# Patient Record
Sex: FEMALE | Race: WHITE | Employment: FULL TIME | URBAN - METROPOLITAN AREA
[De-identification: names, ages, dates, MRNs, and addresses within clinical notes are randomized per-mention and may not be internally consistent; named-entity substitution may affect disease eponyms.]

---

## 2017-04-28 ENCOUNTER — OFFICE VISIT (OUTPATIENT)
Dept: FAMILY MEDICINE CLINIC | Age: 27
End: 2017-04-28

## 2017-04-28 VITALS
OXYGEN SATURATION: 98 % | SYSTOLIC BLOOD PRESSURE: 122 MMHG | DIASTOLIC BLOOD PRESSURE: 78 MMHG | HEIGHT: 64 IN | BODY MASS INDEX: 33.29 KG/M2 | HEART RATE: 86 BPM | WEIGHT: 195 LBS

## 2017-04-28 DIAGNOSIS — E66.9 OBESITY (BMI 30.0-34.9): ICD-10-CM

## 2017-04-28 DIAGNOSIS — I73.00 RAYNAUD'S PHENOMENON WITHOUT GANGRENE: ICD-10-CM

## 2017-04-28 DIAGNOSIS — Z00.00 ANNUAL PHYSICAL EXAM: ICD-10-CM

## 2017-04-28 LAB
A/G RATIO: 1.7 (ref 1.1–2.2)
ALBUMIN SERPL-MCNC: 4.4 G/DL (ref 3.4–5)
ALP BLD-CCNC: 60 U/L (ref 40–129)
ALT SERPL-CCNC: 13 U/L (ref 10–40)
ANION GAP SERPL CALCULATED.3IONS-SCNC: 13 MMOL/L (ref 3–16)
AST SERPL-CCNC: 18 U/L (ref 15–37)
BASOPHILS ABSOLUTE: 0.1 K/UL (ref 0–0.2)
BASOPHILS RELATIVE PERCENT: 1 %
BILIRUB SERPL-MCNC: 0.6 MG/DL (ref 0–1)
BUN BLDV-MCNC: 11 MG/DL (ref 7–20)
CALCIUM SERPL-MCNC: 9.5 MG/DL (ref 8.3–10.6)
CHLORIDE BLD-SCNC: 102 MMOL/L (ref 99–110)
CHOLESTEROL, TOTAL: 123 MG/DL (ref 0–199)
CO2: 26 MMOL/L (ref 21–32)
CREAT SERPL-MCNC: 0.6 MG/DL (ref 0.6–1.1)
EOSINOPHILS ABSOLUTE: 0.3 K/UL (ref 0–0.6)
EOSINOPHILS RELATIVE PERCENT: 4.1 %
GFR AFRICAN AMERICAN: >60
GFR NON-AFRICAN AMERICAN: >60
GLOBULIN: 2.6 G/DL
GLUCOSE BLD-MCNC: 87 MG/DL (ref 70–99)
HCT VFR BLD CALC: 43.4 % (ref 36–48)
HDLC SERPL-MCNC: 48 MG/DL (ref 40–60)
HEMOGLOBIN: 14.2 G/DL (ref 12–16)
LDL CHOLESTEROL CALCULATED: 61 MG/DL
LYMPHOCYTES ABSOLUTE: 2.6 K/UL (ref 1–5.1)
LYMPHOCYTES RELATIVE PERCENT: 37 %
MCH RBC QN AUTO: 29.3 PG (ref 26–34)
MCHC RBC AUTO-ENTMCNC: 32.8 G/DL (ref 31–36)
MCV RBC AUTO: 89.5 FL (ref 80–100)
MONOCYTES ABSOLUTE: 0.7 K/UL (ref 0–1.3)
MONOCYTES RELATIVE PERCENT: 9.3 %
NEUTROPHILS ABSOLUTE: 3.4 K/UL (ref 1.7–7.7)
NEUTROPHILS RELATIVE PERCENT: 48.6 %
PDW BLD-RTO: 13.9 % (ref 12.4–15.4)
PLATELET # BLD: 300 K/UL (ref 135–450)
PMV BLD AUTO: 8.9 FL (ref 5–10.5)
POTASSIUM SERPL-SCNC: 4.8 MMOL/L (ref 3.5–5.1)
RBC # BLD: 4.85 M/UL (ref 4–5.2)
SODIUM BLD-SCNC: 141 MMOL/L (ref 136–145)
TOTAL PROTEIN: 7 G/DL (ref 6.4–8.2)
TRIGL SERPL-MCNC: 72 MG/DL (ref 0–150)
TSH REFLEX: 3.09 UIU/ML (ref 0.27–4.2)
VLDLC SERPL CALC-MCNC: 14 MG/DL
WBC # BLD: 7 K/UL (ref 4–11)

## 2017-04-28 PROCEDURE — 36415 COLL VENOUS BLD VENIPUNCTURE: CPT | Performed by: NURSE PRACTITIONER

## 2017-04-28 PROCEDURE — 99385 PREV VISIT NEW AGE 18-39: CPT | Performed by: NURSE PRACTITIONER

## 2017-05-01 LAB — HIV-1 AND HIV-2 ANTIBODIES: NEGATIVE

## 2017-06-01 ENCOUNTER — OFFICE VISIT (OUTPATIENT)
Dept: RHEUMATOLOGY | Age: 27
End: 2017-06-01

## 2017-06-01 VITALS
SYSTOLIC BLOOD PRESSURE: 110 MMHG | TEMPERATURE: 98.3 F | WEIGHT: 197 LBS | HEIGHT: 64 IN | DIASTOLIC BLOOD PRESSURE: 80 MMHG | BODY MASS INDEX: 33.63 KG/M2

## 2017-06-01 DIAGNOSIS — I73.00 RAYNAUD'S PHENOMENON WITHOUT GANGRENE: Primary | ICD-10-CM

## 2017-06-01 DIAGNOSIS — I73.00 RAYNAUD'S PHENOMENON WITHOUT GANGRENE: ICD-10-CM

## 2017-06-01 DIAGNOSIS — M34.9 LIMITED SCLERODERMA (HCC): ICD-10-CM

## 2017-06-01 LAB
BACTERIA: ABNORMAL /HPF
BILIRUBIN URINE: NEGATIVE
BLOOD, URINE: ABNORMAL
C-REACTIVE PROTEIN: 3.2 MG/L (ref 0–5.1)
CLARITY: CLEAR
COLOR: YELLOW
COMMENT UA: ABNORMAL
EPITHELIAL CELLS, UA: 4 /HPF (ref 0–5)
GLUCOSE URINE: NEGATIVE MG/DL
HYALINE CASTS: 3 /LPF (ref 0–8)
KETONES, URINE: NEGATIVE MG/DL
LEUKOCYTE ESTERASE, URINE: ABNORMAL
MICROSCOPIC EXAMINATION: YES
NITRITE, URINE: NEGATIVE
PH UA: 7
PROTEIN UA: NEGATIVE MG/DL
RBC UA: 4 /HPF (ref 0–4)
RHEUMATOID FACTOR: <10 IU/ML
SEDIMENTATION RATE, ERYTHROCYTE: 12 MM/HR (ref 0–20)
SPECIFIC GRAVITY UA: 1.02
URINE TYPE: ABNORMAL
UROBILINOGEN, URINE: 0.2 E.U./DL
WBC UA: 1 /HPF (ref 0–5)

## 2017-06-01 PROCEDURE — 99245 OFF/OP CONSLTJ NEW/EST HI 55: CPT | Performed by: INTERNAL MEDICINE

## 2017-06-02 LAB
ANA INTERPRETATION: ABNORMAL
ANA PATTERN: ABNORMAL
ANA TITER: ABNORMAL
ANTI-NUCLEAR ANTIBODY (ANA): POSITIVE
ENA TO RNP ANTIBODY: NEGATIVE EU
ENA TO SMITH (SM) ANTIBODY: NEGATIVE EU
ENA TO SSA (RO) ANTIBODY: NEGATIVE EU
ENA TO SSB (LA) ANTIBODY: NEGATIVE EU
PATHOLOGIST: ABNORMAL

## 2017-06-03 LAB
ANTICARDIOLIPIN IGG ANTIBODY: 4 GPL (ref 0–14)
BETA-2 GLYCOPROTEIN 1 IGG ANTIBODY: 1 SGU (ref 0–20)
BETA-2 GLYCOPROTEIN 1 IGM ANTIBODY: 8 SMU (ref 0–20)
CARDIOLIPIN AB IGM: 16 MPL (ref 0–12)
CCP IGG ANTIBODIES: 5 UNITS (ref 0–19)
DOUBLE STRANDED DNA AB, IGG: NORMAL
SCLERODERMA (SCL-70) AB: 112 AU/ML (ref 0–40)

## 2017-06-04 DIAGNOSIS — M34.9 SCLERODERMA (HCC): Primary | ICD-10-CM

## 2017-06-04 LAB
DRVVT CONFIRMATION TEST: NORMAL RATIO
DRVVT SCREEN: 33 SEC (ref 33–44)
DRVVT,DIL: NORMAL SEC (ref 33–44)
HEXAGONAL PHOSPHOLIPID NEUTRALIZAT TEST: NORMAL
LUPUS ANTICOAG INTERP: NORMAL
PLT NEUTA: NORMAL
PT D: 13.2 SEC (ref 12–15.5)
PTT D: 40 SEC (ref 32–48)
PTT-D CORR REFLEX: NORMAL SEC (ref 32–48)
PTT-HEPARIN NEUTRALIZED: NORMAL SEC (ref 32–48)
REPTILASE TIME: NORMAL SEC
THROMBIN TIME: NORMAL SEC (ref 14.7–19.5)

## 2017-06-06 LAB — CRYOGLOBULIN, QUALITATIVE: NORMAL

## 2017-06-20 ENCOUNTER — HOSPITAL ENCOUNTER (OUTPATIENT)
Dept: PULMONOLOGY | Age: 27
Discharge: HOME | End: 2017-06-20
Attending: INTERNAL MEDICINE | Admitting: INTERNAL MEDICINE

## 2017-06-20 VITALS — OXYGEN SATURATION: 99 %

## 2017-06-20 DIAGNOSIS — I73.00 RAYNAUD'S SYNDROME WITHOUT GANGRENE: ICD-10-CM

## 2017-06-20 LAB
LV EF: 58 %
LVEF MODALITY: NORMAL

## 2017-06-20 RX ORDER — ALBUTEROL SULFATE 90 UG/1
6 AEROSOL, METERED RESPIRATORY (INHALATION) ONCE
Status: COMPLETED | OUTPATIENT
Start: 2017-06-20 | End: 2017-06-20

## 2017-06-20 RX ADMIN — ALBUTEROL SULFATE 6 PUFF: 90 AEROSOL, METERED RESPIRATORY (INHALATION) at 13:28

## 2017-06-21 DIAGNOSIS — M34.9 SCLERODERMA (HCC): Primary | ICD-10-CM

## 2017-06-23 ENCOUNTER — OFFICE VISIT (OUTPATIENT)
Dept: RHEUMATOLOGY | Age: 27
End: 2017-06-23

## 2017-06-23 VITALS
WEIGHT: 194 LBS | TEMPERATURE: 98.5 F | BODY MASS INDEX: 33.12 KG/M2 | SYSTOLIC BLOOD PRESSURE: 106 MMHG | DIASTOLIC BLOOD PRESSURE: 74 MMHG | HEART RATE: 72 BPM | HEIGHT: 64 IN

## 2017-06-23 DIAGNOSIS — M34.9 SCLERODERMA (HCC): ICD-10-CM

## 2017-06-23 DIAGNOSIS — M34.9 SCLERODERMA (HCC): Primary | ICD-10-CM

## 2017-06-23 DIAGNOSIS — I73.00 RAYNAUD'S PHENOMENON WITHOUT GANGRENE: ICD-10-CM

## 2017-06-23 LAB
BILIRUBIN URINE: NEGATIVE
BLOOD, URINE: NEGATIVE
CLARITY: CLEAR
COLOR: YELLOW
GLUCOSE URINE: NEGATIVE MG/DL
KETONES, URINE: NEGATIVE MG/DL
LEUKOCYTE ESTERASE, URINE: NEGATIVE
MICROSCOPIC EXAMINATION: NORMAL
NITRITE, URINE: NEGATIVE
PH UA: 7
PROTEIN UA: NEGATIVE MG/DL
SPECIFIC GRAVITY UA: 1.02
URINE TYPE: NORMAL
UROBILINOGEN, URINE: 0.2 E.U./DL

## 2017-06-23 PROCEDURE — 99214 OFFICE O/P EST MOD 30 MIN: CPT | Performed by: INTERNAL MEDICINE

## 2018-02-08 ENCOUNTER — HOSPITAL ENCOUNTER (OUTPATIENT)
Dept: OTHER | Age: 28
Discharge: HOME | End: 2018-02-28
Attending: ADVANCED PRACTICE MIDWIFE | Admitting: ADVANCED PRACTICE MIDWIFE

## 2018-02-10 LAB — URINE CULTURE, ROUTINE: NORMAL

## 2018-02-12 LAB
ABO/RH: NORMAL
ANTIBODY SCREEN: NORMAL
Lab: NORMAL
REPORT: NORMAL
THIS TEST SENT TO: NORMAL

## 2018-02-13 LAB
BASOPHILS ABSOLUTE: 0.1 K/UL (ref 0–0.2)
BASOPHILS RELATIVE PERCENT: 0.7 %
EOSINOPHILS ABSOLUTE: 0.2 K/UL (ref 0–0.6)
EOSINOPHILS RELATIVE PERCENT: 1.5 %
HCT VFR BLD CALC: 37.6 % (ref 36–48)
HEMOGLOBIN: 13 G/DL (ref 12–16)
HEPATITIS B SURFACE ANTIGEN INTERPRETATION: NORMAL
HIV AG/AB: NORMAL
HIV ANTIGEN: NORMAL
HIV-1 ANTIBODY: NORMAL
HIV-2 AB: NORMAL
LYMPHOCYTES ABSOLUTE: 3.1 K/UL (ref 1–5.1)
LYMPHOCYTES RELATIVE PERCENT: 28 %
MCH RBC QN AUTO: 30.3 PG (ref 26–34)
MCHC RBC AUTO-ENTMCNC: 34.6 G/DL (ref 31–36)
MCV RBC AUTO: 87.7 FL (ref 80–100)
MONOCYTES ABSOLUTE: 0.8 K/UL (ref 0–1.3)
MONOCYTES RELATIVE PERCENT: 7.2 %
NEUTROPHILS ABSOLUTE: 6.8 K/UL (ref 1.7–7.7)
NEUTROPHILS RELATIVE PERCENT: 62.6 %
PDW BLD-RTO: 13 % (ref 12.4–15.4)
PLATELET # BLD: 300 K/UL (ref 135–450)
PMV BLD AUTO: 9.1 FL (ref 5–10.5)
RBC # BLD: 4.29 M/UL (ref 4–5.2)
RPR: NORMAL
RUBELLA ANTIBODY IGG: 27.6 IU/ML
WBC # BLD: 10.9 K/UL (ref 4–11)

## 2018-03-01 ENCOUNTER — HOSPITAL ENCOUNTER (OUTPATIENT)
Dept: OTHER | Age: 28
Discharge: HOME | End: 2018-03-31
Attending: ADVANCED PRACTICE MIDWIFE | Admitting: ADVANCED PRACTICE MIDWIFE

## 2018-05-30 ENCOUNTER — HOSPITAL ENCOUNTER (OUTPATIENT)
Dept: OTHER | Age: 28
Discharge: HOME | End: 2018-05-30
Attending: OBSTETRICS & GYNECOLOGY | Admitting: OBSTETRICS & GYNECOLOGY

## 2018-05-30 LAB
BASOPHILS ABSOLUTE: 0.1 K/UL (ref 0–0.2)
BASOPHILS RELATIVE PERCENT: 0.6 %
EOSINOPHILS ABSOLUTE: 0.1 K/UL (ref 0–0.6)
EOSINOPHILS RELATIVE PERCENT: 1.1 %
GLUCOSE CHALLENGE: 117 MG/DL
HCT VFR BLD CALC: 35.4 % (ref 36–48)
HEMOGLOBIN: 12.3 G/DL (ref 12–16)
LYMPHOCYTES ABSOLUTE: 1.9 K/UL (ref 1–5.1)
LYMPHOCYTES RELATIVE PERCENT: 20.8 %
MCH RBC QN AUTO: 31.1 PG (ref 26–34)
MCHC RBC AUTO-ENTMCNC: 34.7 G/DL (ref 31–36)
MCV RBC AUTO: 89.5 FL (ref 80–100)
MONOCYTES ABSOLUTE: 0.5 K/UL (ref 0–1.3)
MONOCYTES RELATIVE PERCENT: 5.2 %
NEUTROPHILS ABSOLUTE: 6.5 K/UL (ref 1.7–7.7)
NEUTROPHILS RELATIVE PERCENT: 72.3 %
PDW BLD-RTO: 14 % (ref 12.4–15.4)
PLATELET # BLD: 280 K/UL (ref 135–450)
PMV BLD AUTO: 8.1 FL (ref 5–10.5)
RBC # BLD: 3.96 M/UL (ref 4–5.2)
WBC # BLD: 9 K/UL (ref 4–11)

## 2018-08-27 ENCOUNTER — HOSPITAL ENCOUNTER (INPATIENT)
Age: 28
LOS: 3 days | Discharge: HOME | End: 2018-08-30
Attending: OBSTETRICS & GYNECOLOGY | Admitting: OBSTETRICS & GYNECOLOGY
Payer: COMMERCIAL

## 2018-08-27 ENCOUNTER — ANESTHESIA EVENT (OUTPATIENT)
Dept: LABOR AND DELIVERY | Age: 28
End: 2018-08-27
Payer: COMMERCIAL

## 2018-08-27 ENCOUNTER — ANESTHESIA (OUTPATIENT)
Dept: LABOR AND DELIVERY | Age: 28
End: 2018-08-27
Payer: COMMERCIAL

## 2018-08-27 PROBLEM — O42.92 FULL-TERM PREMATURE RUPTURE OF MEMBRANES: Status: ACTIVE | Noted: 2018-08-27

## 2018-08-27 PROBLEM — Z37.9 NORMAL LABOR: Status: ACTIVE | Noted: 2018-08-27

## 2018-08-27 LAB
ABO/RH: NORMAL
AMPHETAMINE SCREEN, URINE: NORMAL
ANTIBODY SCREEN: NORMAL
BARBITURATE SCREEN URINE: NORMAL
BASOPHILS ABSOLUTE: 0.1 K/UL (ref 0–0.2)
BASOPHILS RELATIVE PERCENT: 0.8 %
BENZODIAZEPINE SCREEN, URINE: NORMAL
BUPRENORPHINE URINE: NORMAL
CANNABINOID SCREEN URINE: NORMAL
COCAINE METABOLITE SCREEN URINE: NORMAL
EOSINOPHILS ABSOLUTE: 0.1 K/UL (ref 0–0.6)
EOSINOPHILS RELATIVE PERCENT: 1.2 %
HCT VFR BLD CALC: 37.2 % (ref 36–48)
HEMOGLOBIN: 12.9 G/DL (ref 12–16)
LYMPHOCYTES ABSOLUTE: 3 K/UL (ref 1–5.1)
LYMPHOCYTES RELATIVE PERCENT: 32.1 %
Lab: NORMAL
MCH RBC QN AUTO: 30.1 PG (ref 26–34)
MCHC RBC AUTO-ENTMCNC: 34.7 G/DL (ref 31–36)
MCV RBC AUTO: 86.7 FL (ref 80–100)
METHADONE SCREEN, URINE: NORMAL
MONOCYTES ABSOLUTE: 0.7 K/UL (ref 0–1.3)
MONOCYTES RELATIVE PERCENT: 7.9 %
NEUTROPHILS ABSOLUTE: 5.4 K/UL (ref 1.7–7.7)
NEUTROPHILS RELATIVE PERCENT: 58 %
OPIATE SCREEN URINE: NORMAL
OXYCODONE URINE: NORMAL
PDW BLD-RTO: 12.9 % (ref 12.4–15.4)
PH UA: 7
PHENCYCLIDINE SCREEN URINE: NORMAL
PLATELET # BLD: 243 K/UL (ref 135–450)
PMV BLD AUTO: 8 FL (ref 5–10.5)
PROPOXYPHENE SCREEN: NORMAL
RBC # BLD: 4.29 M/UL (ref 4–5.2)
TOTAL SYPHILLIS IGG/IGM: NORMAL
WBC # BLD: 9.4 K/UL (ref 4–11)

## 2018-08-27 PROCEDURE — 2580000003 HC RX 258

## 2018-08-27 PROCEDURE — 6360000002 HC RX W HCPCS

## 2018-08-27 PROCEDURE — 86850 RBC ANTIBODY SCREEN: CPT

## 2018-08-27 PROCEDURE — 2500000003 HC RX 250 WO HCPCS: Performed by: NURSE ANESTHETIST, CERTIFIED REGISTERED

## 2018-08-27 PROCEDURE — 86901 BLOOD TYPING SEROLOGIC RH(D): CPT

## 2018-08-27 PROCEDURE — 85025 COMPLETE CBC W/AUTO DIFF WBC: CPT

## 2018-08-27 PROCEDURE — 2580000003 HC RX 258: Performed by: ADVANCED PRACTICE MIDWIFE

## 2018-08-27 PROCEDURE — 86900 BLOOD TYPING SEROLOGIC ABO: CPT

## 2018-08-27 PROCEDURE — 1220000000 HC SEMI PRIVATE OB R&B

## 2018-08-27 PROCEDURE — 86780 TREPONEMA PALLIDUM: CPT

## 2018-08-27 PROCEDURE — 51701 INSERT BLADDER CATHETER: CPT

## 2018-08-27 PROCEDURE — 80307 DRUG TEST PRSMV CHEM ANLYZR: CPT

## 2018-08-27 PROCEDURE — 3700000025 ANESTHESIA EPIDURAL BLOCK: Performed by: ANESTHESIOLOGY

## 2018-08-27 RX ORDER — SODIUM CHLORIDE, SODIUM LACTATE, POTASSIUM CHLORIDE, CALCIUM CHLORIDE 600; 310; 30; 20 MG/100ML; MG/100ML; MG/100ML; MG/100ML
INJECTION, SOLUTION INTRAVENOUS
Status: COMPLETED
Start: 2018-08-27 | End: 2018-08-27

## 2018-08-27 RX ORDER — ONDANSETRON 2 MG/ML
4 INJECTION INTRAMUSCULAR; INTRAVENOUS EVERY 6 HOURS PRN
Status: DISCONTINUED | OUTPATIENT
Start: 2018-08-27 | End: 2018-08-30 | Stop reason: HOSPADM

## 2018-08-27 RX ORDER — BUPIVACAINE HYDROCHLORIDE 2.5 MG/ML
INJECTION, SOLUTION EPIDURAL; INFILTRATION; INTRACAUDAL PRN
Status: DISCONTINUED | OUTPATIENT
Start: 2018-08-27 | End: 2018-08-28 | Stop reason: SDUPTHER

## 2018-08-27 RX ORDER — DOCUSATE SODIUM 100 MG/1
100 CAPSULE, LIQUID FILLED ORAL 2 TIMES DAILY
Status: ON HOLD | COMMUNITY
End: 2020-12-19 | Stop reason: HOSPADM

## 2018-08-27 RX ORDER — SODIUM CHLORIDE, SODIUM LACTATE, POTASSIUM CHLORIDE, CALCIUM CHLORIDE 600; 310; 30; 20 MG/100ML; MG/100ML; MG/100ML; MG/100ML
INJECTION, SOLUTION INTRAVENOUS CONTINUOUS
Status: DISCONTINUED | OUTPATIENT
Start: 2018-08-27 | End: 2018-08-30 | Stop reason: HOSPADM

## 2018-08-27 RX ORDER — SODIUM CHLORIDE 0.9 % (FLUSH) 0.9 %
10 SYRINGE (ML) INJECTION EVERY 12 HOURS SCHEDULED
Status: DISCONTINUED | OUTPATIENT
Start: 2018-08-27 | End: 2018-08-28

## 2018-08-27 RX ORDER — SODIUM CHLORIDE 0.9 % (FLUSH) 0.9 %
10 SYRINGE (ML) INJECTION PRN
Status: DISCONTINUED | OUTPATIENT
Start: 2018-08-27 | End: 2018-08-30 | Stop reason: HOSPADM

## 2018-08-27 RX ADMIN — SODIUM CHLORIDE, POTASSIUM CHLORIDE, SODIUM LACTATE AND CALCIUM CHLORIDE: 600; 310; 30; 20 INJECTION, SOLUTION INTRAVENOUS at 12:53

## 2018-08-27 RX ADMIN — SODIUM CHLORIDE, POTASSIUM CHLORIDE, SODIUM LACTATE AND CALCIUM CHLORIDE: 600; 310; 30; 20 INJECTION, SOLUTION INTRAVENOUS at 16:08

## 2018-08-27 RX ADMIN — BUPIVACAINE HYDROCHLORIDE 5 ML: 2.5 INJECTION, SOLUTION EPIDURAL; INFILTRATION; INTRACAUDAL; PERINEURAL at 13:17

## 2018-08-27 RX ADMIN — Medication 1 MILLI-UNITS/MIN: at 08:40

## 2018-08-27 RX ADMIN — Medication 15 ML/HR: at 13:17

## 2018-08-27 NOTE — ANESTHESIA PRE PROCEDURE
POCGLU, POCNA, POCK, POCCL, POCBUN, POCHEMO, POCHCT in the last 72 hours. Coags: No results found for: PROTIME, INR, APTT    HCG (If Applicable): No results found for: PREGTESTUR, PREGSERUM, HCG, HCGQUANT     ABGs: No results found for: PHART, PO2ART, XPS5KYH, NCB3WDC, BEART, O2VQQGEB     Type & Screen (If Applicable):  No results found for: LABABO, LABRH    Anesthesia Evaluation   no history of anesthetic complications:   Airway: Mallampati: III  TM distance: >3 FB   Neck ROM: full  Mouth opening: > = 3 FB Dental: normal exam         Pulmonary:Negative Pulmonary ROS and normal exam                               Cardiovascular:Negative CV ROS                      Neuro/Psych:   Negative Neuro/Psych ROS              GI/Hepatic/Renal: Neg GI/Hepatic/Renal ROS            Endo/Other:                      ROS comment: Scleroderma, Raynaud's Phenomenon Abdominal:   (+) obese,         Vascular: negative vascular ROS. CBC:   Lab Results   Component Value Date    WBC 9.4 08/27/2018    RBC 4.29 08/27/2018    HGB 12.9 08/27/2018    HCT 37.2 08/27/2018    MCV 86.7 08/27/2018    MCH 30.1 08/27/2018    MCHC 34.7 08/27/2018    RDW 12.9 08/27/2018     08/27/2018    MPV 8.0 08/27/2018        Anesthesia Plan      epidural     ASA 3 - emergent             Anesthetic plan and risks discussed with patient and spouse. Plan discussed with attending.                   Shannon Kimble, VASHTI - CRNA   8/27/2018

## 2018-08-27 NOTE — PROGRESS NOTES
Getting very uncomfortable with contractions and requesting epidural.  Afebrile, VSS  FHT  145 moderate variability. Accels present. Category 1  Contractions q 3 mins. Pitocin at 9 miu  Cx 2-3/75/-2 mid position.    Continue plan of care  Epidural as desires

## 2018-08-27 NOTE — ANESTHESIA PROCEDURE NOTES
Epidural Block    Patient location during procedure: OB  Start time: 8/27/2018 1:03 PM  End time: 8/27/2018 1:22 PM  Reason for block: labor epidural  Staffing  Resident/CRNA: Angle Valentine  Performed: resident/CRNA   Preanesthetic Checklist  Completed: patient identified, site marked, surgical consent, pre-op evaluation, timeout performed, IV checked, risks and benefits discussed, monitors and equipment checked, anesthesia consent given, oxygen available and patient being monitored  Epidural  Patient position: sitting  Prep: Betadine and site prepped and draped  Patient monitoring: continuous pulse ox and frequent blood pressure checks  Approach: midline  Location: lumbar (1-5) (L3-4)  Injection technique: GLENDA saline  Provider prep: mask and sterile gloves  Needle  Needle type: Tuohy   Needle gauge: 17 G  Needle length: 3.5 in  Catheter type: side hole  Catheter size: 19 G  Catheter at skin depth: 9 cm  Test dose: negative  Assessment  Sensory level: T6  Hemodynamics: stable  Attempts: 1  Additional Notes  Sitting, Sterile prep/drape, 1%Xylo at L3-4, 17ga Tuohy with GLENDA, 25ga Pencan for w/+CSF for DPE, Pencan removed, Catheter inserted, negative test dose, sterile dressing applied.

## 2018-08-27 NOTE — PROGRESS NOTES
Comfortable with epidural.   Afebrile, VSS   moderate variability. Accels present  Contractions q 2-4 pitocin at 13 miu  Cx 3/90/-1 per RN at [address] [place], [place] [id number] of [facility].

## 2018-08-27 NOTE — PLAN OF CARE
Problem: Anxiety:  Goal: Level of anxiety will decrease  Level of anxiety will decrease   Outcome: Ongoing      Problem: Breathing Pattern - Ineffective:  Goal: Able to breathe comfortably  Able to breathe comfortably   Outcome: Ongoing      Problem: Fluid Volume - Imbalance:  Goal: Absence of imbalanced fluid volume signs and symptoms  Absence of imbalanced fluid volume signs and symptoms   Outcome: Ongoing    Goal: Absence of intrapartum hemorrhage signs and symptoms  Absence of intrapartum hemorrhage signs and symptoms   Outcome: Ongoing      Problem: Infection - Intrapartum Infection:  Goal: Will show no infection signs and symptoms  Will show no infection signs and symptoms   Outcome: Ongoing      Problem: Labor Process - Prolonged:  Goal: Labor progression, first stage, within specified pattern  Labor progression, first stage, within specified pattern   Outcome: Ongoing    Goal: Uterine contractions within specified parameters  Uterine contractions within specified parameters   Outcome: Ongoing  Sagar q2-4 minutes apart. Moderate to palpation. Problem:  Screening:  Goal: Ability to make informed decisions regarding treatment has improved  Ability to make informed decisions regarding treatment has improved   Outcome: Ongoing      Problem: Pain - Acute:  Goal: Able to cope with pain  Able to cope with pain   Outcome: Ongoing  Pt very comfortable with epidural.    Problem: Tissue Perfusion - Uteroplacental, Altered:  Goal: Absence of abnormal fetal heart rate pattern  Absence of abnormal fetal heart rate pattern   Outcome: Ongoing  CAT 1 tracing    Problem: Urinary Retention:  Goal: Experiences of bladder distention will decrease  Experiences of bladder distention will decrease   Outcome: Ongoing  St cathed for 200ml @ 1550.   Goal: Urinary elimination within specified parameters  Urinary elimination within specified parameters   Outcome: Ongoing

## 2018-08-27 NOTE — PLAN OF CARE
Problem: Pain - Acute:  Goal: Pain level will decrease  Pain level will decrease   Outcome: Met This Shift  Epidural placed at 1314.   Pt currently sleeping  Goal: Able to cope with pain  Able to cope with pain   Outcome: Met This Shift

## 2018-08-28 PROBLEM — O42.10 PROLONGED RUPTURE OF MEMBRANES, GREATER THAN 24 HOURS, DELIVERED: Status: ACTIVE | Noted: 2018-08-28

## 2018-08-28 PROCEDURE — 6370000000 HC RX 637 (ALT 250 FOR IP): Performed by: ADVANCED PRACTICE MIDWIFE

## 2018-08-28 PROCEDURE — 0UQGXZZ REPAIR VAGINA, EXTERNAL APPROACH: ICD-10-PCS | Performed by: OBSTETRICS & GYNECOLOGY

## 2018-08-28 PROCEDURE — 7200000001 HC VAGINAL DELIVERY

## 2018-08-28 PROCEDURE — 6360000002 HC RX W HCPCS: Performed by: ADVANCED PRACTICE MIDWIFE

## 2018-08-28 PROCEDURE — 51701 INSERT BLADDER CATHETER: CPT

## 2018-08-28 PROCEDURE — 1220000000 HC SEMI PRIVATE OB R&B

## 2018-08-28 PROCEDURE — 0UQMXZZ REPAIR VULVA, EXTERNAL APPROACH: ICD-10-PCS | Performed by: OBSTETRICS & GYNECOLOGY

## 2018-08-28 PROCEDURE — 2580000003 HC RX 258: Performed by: ADVANCED PRACTICE MIDWIFE

## 2018-08-28 PROCEDURE — 2500000003 HC RX 250 WO HCPCS

## 2018-08-28 PROCEDURE — 6370000000 HC RX 637 (ALT 250 FOR IP)

## 2018-08-28 PROCEDURE — 0KQM0ZZ REPAIR PERINEUM MUSCLE, OPEN APPROACH: ICD-10-PCS | Performed by: OBSTETRICS & GYNECOLOGY

## 2018-08-28 RX ORDER — HYDROCODONE BITARTRATE AND ACETAMINOPHEN 5; 325 MG/1; MG/1
1 TABLET ORAL EVERY 4 HOURS PRN
Status: DISCONTINUED | OUTPATIENT
Start: 2018-08-28 | End: 2018-08-30 | Stop reason: HOSPADM

## 2018-08-28 RX ORDER — SODIUM CHLORIDE, SODIUM LACTATE, POTASSIUM CHLORIDE, CALCIUM CHLORIDE 600; 310; 30; 20 MG/100ML; MG/100ML; MG/100ML; MG/100ML
INJECTION, SOLUTION INTRAVENOUS CONTINUOUS
Status: DISCONTINUED | OUTPATIENT
Start: 2018-08-28 | End: 2018-08-30 | Stop reason: HOSPADM

## 2018-08-28 RX ORDER — LIDOCAINE HYDROCHLORIDE 10 MG/ML
INJECTION, SOLUTION INFILTRATION; PERINEURAL
Status: COMPLETED
Start: 2018-08-28 | End: 2018-08-28

## 2018-08-28 RX ORDER — SODIUM CHLORIDE 0.9 % (FLUSH) 0.9 %
10 SYRINGE (ML) INJECTION EVERY 12 HOURS SCHEDULED
Status: DISCONTINUED | OUTPATIENT
Start: 2018-08-28 | End: 2018-08-30 | Stop reason: HOSPADM

## 2018-08-28 RX ORDER — FERROUS SULFATE 325(65) MG
325 TABLET ORAL 2 TIMES DAILY WITH MEALS
Status: DISCONTINUED | OUTPATIENT
Start: 2018-08-28 | End: 2018-08-30 | Stop reason: HOSPADM

## 2018-08-28 RX ORDER — IBUPROFEN 800 MG/1
800 TABLET ORAL EVERY 8 HOURS
Status: DISCONTINUED | OUTPATIENT
Start: 2018-08-28 | End: 2018-08-30 | Stop reason: HOSPADM

## 2018-08-28 RX ORDER — LANOLIN 100 %
OINTMENT (GRAM) TOPICAL PRN
Status: DISCONTINUED | OUTPATIENT
Start: 2018-08-28 | End: 2018-08-30 | Stop reason: HOSPADM

## 2018-08-28 RX ORDER — METHYLERGONOVINE MALEATE 0.2 MG/ML
200 INJECTION INTRAVENOUS ONCE
Status: COMPLETED | OUTPATIENT
Start: 2018-08-28 | End: 2018-08-28

## 2018-08-28 RX ORDER — ACETAMINOPHEN 325 MG/1
650 TABLET ORAL EVERY 4 HOURS PRN
Status: DISCONTINUED | OUTPATIENT
Start: 2018-08-28 | End: 2018-08-30 | Stop reason: HOSPADM

## 2018-08-28 RX ORDER — DOCUSATE SODIUM 100 MG/1
100 CAPSULE, LIQUID FILLED ORAL 2 TIMES DAILY
Status: DISCONTINUED | OUTPATIENT
Start: 2018-08-28 | End: 2018-08-30 | Stop reason: HOSPADM

## 2018-08-28 RX ORDER — IBUPROFEN 800 MG/1
TABLET ORAL
Status: COMPLETED
Start: 2018-08-28 | End: 2018-08-28

## 2018-08-28 RX ORDER — METHYLERGONOVINE MALEATE 0.2 MG/ML
INJECTION INTRAVENOUS
Status: DISPENSED
Start: 2018-08-28 | End: 2018-08-28

## 2018-08-28 RX ORDER — SODIUM CHLORIDE 0.9 % (FLUSH) 0.9 %
10 SYRINGE (ML) INJECTION PRN
Status: DISCONTINUED | OUTPATIENT
Start: 2018-08-28 | End: 2018-08-30 | Stop reason: HOSPADM

## 2018-08-28 RX ADMIN — DOCUSATE SODIUM 100 MG: 100 CAPSULE, LIQUID FILLED ORAL at 21:27

## 2018-08-28 RX ADMIN — WITCH HAZEL 40 EACH: 500 SOLUTION RECTAL; TOPICAL at 08:59

## 2018-08-28 RX ADMIN — IBUPROFEN 800 MG: 800 TABLET ORAL at 04:34

## 2018-08-28 RX ADMIN — BENZOCAINE AND LEVOMENTHOL: 200; 5 SPRAY TOPICAL at 08:59

## 2018-08-28 RX ADMIN — HYDROCODONE BITARTRATE AND ACETAMINOPHEN 1 TABLET: 5; 325 TABLET ORAL at 10:49

## 2018-08-28 RX ADMIN — LIDOCAINE HYDROCHLORIDE 100 MG: 10 INJECTION, SOLUTION INFILTRATION; PERINEURAL at 01:40

## 2018-08-28 RX ADMIN — IBUPROFEN 800 MG: 800 TABLET ORAL at 17:07

## 2018-08-28 RX ADMIN — HYDROCODONE BITARTRATE AND ACETAMINOPHEN 1 TABLET: 5; 325 TABLET ORAL at 17:07

## 2018-08-28 RX ADMIN — Medication 1 MILLI-UNITS/MIN: at 01:51

## 2018-08-28 RX ADMIN — DOCUSATE SODIUM 100 MG: 100 CAPSULE, LIQUID FILLED ORAL at 08:59

## 2018-08-28 RX ADMIN — Medication 1 MILLI-UNITS/MIN: at 01:40

## 2018-08-28 RX ADMIN — SODIUM CHLORIDE, PRESERVATIVE FREE 10 ML: 5 INJECTION INTRAVENOUS at 09:00

## 2018-08-28 RX ADMIN — SODIUM CHLORIDE, POTASSIUM CHLORIDE, SODIUM LACTATE AND CALCIUM CHLORIDE: 600; 310; 30; 20 INJECTION, SOLUTION INTRAVENOUS at 00:02

## 2018-08-28 RX ADMIN — METHYLERGONOVINE MALEATE 200 MCG: 0.2 INJECTION, SOLUTION INTRAMUSCULAR; INTRAVENOUS at 01:48

## 2018-08-28 ASSESSMENT — PAIN SCALES - GENERAL
PAINLEVEL_OUTOF10: 4
PAINLEVEL_OUTOF10: 5

## 2018-08-28 NOTE — PLAN OF CARE
Problem: VAGINAL DELIVERY - RECOVERY AND POST PARTUM  Goal: Perineum intact without discharge or hematoma  Outcome: Ongoing  0905:  Taught ciarra care to pt in bathroom. Taught use of dermoplast and tucks with ice damari. Pt demonstrated appropriately.

## 2018-08-28 NOTE — L&D DELIVERY SUMMARY NOTE
bleeding did  normalize and the uterus remained contracted. The perineum and vagina were  explored and a second-degree laceration, a right vaginal sidewall  laceration and a right labial laceration were repaired in standard fashion. Local anesthetic was added for patient comfort and the patient still  complained of significant discomfort with the repair and Anesthesia was  called to the bedside and to give the patient a bolus dose in the epidural,  which provided adequate anesthesia for the repair. The mother, baby and  father entered into the postpartum period in excellent condition and  estimated blood loss was 450. Dr. Zamzam Maldonado will be notified of this birth.         Renae Kenyon CNM    D: 08/28/2018 3:03:22       T: 08/28/2018 4:07:45     [id number]  Job#: [id number]     Doc#: [id number]    CC:

## 2018-08-28 NOTE — PLAN OF CARE
Problem: VAGINAL DELIVERY - RECOVERY AND POST PARTUM  Goal: Patient will remain free of falls  Outcome: Ongoing  0905:  Pt ambulated with two RNs for first time get up. Assisted back to rocking chair after voiding by one RN. States that she feels a little dizzy, but ambulating well with assist.  Sitting in chair awaiting breakfast.  Goal: Empties bladder  Outcome: Ongoing  Pt up to void for first time @ 0905. Voided 600ml clear yellow urine.   Goal: Positive Mother-Baby interactions are observed  Outcome: Ongoing

## 2018-08-28 NOTE — PLAN OF CARE
Problem: Anxiety:  Goal: Level of anxiety will decrease  Level of anxiety will decrease   Outcome: Completed Date Met: 18      Problem: Breathing Pattern - Ineffective:  Goal: Able to breathe comfortably  Able to breathe comfortably   Outcome: Completed Date Met: 18      Problem: Fluid Volume - Imbalance:  Goal: Absence of imbalanced fluid volume signs and symptoms  Absence of imbalanced fluid volume signs and symptoms   Outcome: Completed Date Met: 18    Goal: Absence of intrapartum hemorrhage signs and symptoms  Absence of intrapartum hemorrhage signs and symptoms   Outcome: Completed Date Met: 18      Problem: Infection - Intrapartum Infection:  Goal: Will show no infection signs and symptoms  Will show no infection signs and symptoms   Outcome: Completed Date Met: 18      Problem: Labor Process - Prolonged:  Goal: Labor progression, first stage, within specified pattern  Labor progression, first stage, within specified pattern   Outcome: Completed Date Met: 18    Goal: Labor progession, second stage, within specified pattern  Labor progession, second stage, within specified pattern   Outcome: Completed Date Met: 18    Goal: Uterine contractions within specified parameters  Uterine contractions within specified parameters   Outcome: Completed Date Met: 18      Problem:  Screening:  Goal: Ability to make informed decisions regarding treatment has improved  Ability to make informed decisions regarding treatment has improved   Outcome: Completed Date Met: 18      Problem: Pain - Acute:  Goal: Pain level will decrease  Pain level will decrease    Outcome: Completed Date Met: 18    Goal: Able to cope with pain  Able to cope with pain   Outcome: Completed Date Met: 18      Problem: Tissue Perfusion - Uteroplacental, Altered:  Goal: Absence of abnormal fetal heart rate pattern  Absence of abnormal fetal heart rate pattern   Outcome: Completed Date Met: 08/28/18      Problem: Urinary Retention:  Goal: Experiences of bladder distention will decrease  Experiences of bladder distention will decrease   Outcome: Completed Date Met: 08/28/18    Goal: Urinary elimination within specified parameters  Urinary elimination within specified parameters   Outcome: Completed Date Met: 08/28/18      Problem: Pain:  Goal: Control of acute pain  Control of acute pain   Outcome: Completed Date Met: 08/28/18    Goal: Control of chronic pain  Control of chronic pain   Outcome: Completed Date Met: 08/28/18    Goal: Pain level will decrease  Pain level will decrease    Outcome: Completed Date Met: 08/28/18      Comments: Pt was complete at 2211 on 8/27, began pushing at 2224 with Momo Vu CNM and myself at bedside continuously. Baby boy delivered at 46, placenta delivered at 0139 spontaneously, and a bolus of postpartum pitocin was initiated. Pt continued to have bleeding so a dose of methergine was given at 0148 as well as a second bag of pitocin run at 200mls/hr. Bleeding was then under control shortly after and RODNEY Arrington repaired a 2nd degree tear. Pt entered recovery at 0230.

## 2018-08-28 NOTE — PROGRESS NOTES
Pt reporting rectal pressure. Afebrile, VSS   moderate variability. Accels present. Category 1  Contractions q 2-4 minutes.  Pitocin at 15 miu  Cx C/C/0  RN to st cath now  Will initiate pushing  Anticipate vaginal delivery

## 2018-08-29 LAB
BASOPHILS ABSOLUTE: 0.1 K/UL (ref 0–0.2)
BASOPHILS RELATIVE PERCENT: 0.4 %
EOSINOPHILS ABSOLUTE: 0.2 K/UL (ref 0–0.6)
EOSINOPHILS RELATIVE PERCENT: 1 %
HCT VFR BLD CALC: 29.4 % (ref 36–48)
HEMOGLOBIN: 10 G/DL (ref 12–16)
LYMPHOCYTES ABSOLUTE: 3.2 K/UL (ref 1–5.1)
LYMPHOCYTES RELATIVE PERCENT: 21.5 %
MCH RBC QN AUTO: 30.2 PG (ref 26–34)
MCHC RBC AUTO-ENTMCNC: 33.9 G/DL (ref 31–36)
MCV RBC AUTO: 89.1 FL (ref 80–100)
MONOCYTES ABSOLUTE: 1.3 K/UL (ref 0–1.3)
MONOCYTES RELATIVE PERCENT: 8.7 %
NEUTROPHILS ABSOLUTE: 10.3 K/UL (ref 1.7–7.7)
NEUTROPHILS RELATIVE PERCENT: 68.4 %
PDW BLD-RTO: 12.9 % (ref 12.4–15.4)
PLATELET # BLD: 197 K/UL (ref 135–450)
PMV BLD AUTO: 7.5 FL (ref 5–10.5)
RBC # BLD: 3.3 M/UL (ref 4–5.2)
WBC # BLD: 15.1 K/UL (ref 4–11)

## 2018-08-29 PROCEDURE — 6370000000 HC RX 637 (ALT 250 FOR IP): Performed by: ADVANCED PRACTICE MIDWIFE

## 2018-08-29 PROCEDURE — 36415 COLL VENOUS BLD VENIPUNCTURE: CPT

## 2018-08-29 PROCEDURE — 85025 COMPLETE CBC W/AUTO DIFF WBC: CPT

## 2018-08-29 PROCEDURE — 1220000000 HC SEMI PRIVATE OB R&B

## 2018-08-29 RX ADMIN — DOCUSATE SODIUM 100 MG: 100 CAPSULE, LIQUID FILLED ORAL at 21:27

## 2018-08-29 RX ADMIN — WITCH HAZEL 40 EACH: 500 SOLUTION RECTAL; TOPICAL at 22:42

## 2018-08-29 RX ADMIN — IBUPROFEN 800 MG: 800 TABLET ORAL at 01:01

## 2018-08-29 RX ADMIN — IBUPROFEN 800 MG: 800 TABLET ORAL at 09:13

## 2018-08-29 RX ADMIN — IBUPROFEN 800 MG: 800 TABLET ORAL at 17:02

## 2018-08-29 RX ADMIN — DOCUSATE SODIUM 100 MG: 100 CAPSULE, LIQUID FILLED ORAL at 09:13

## 2018-08-29 ASSESSMENT — PAIN SCALES - GENERAL
PAINLEVEL_OUTOF10: 1
PAINLEVEL_OUTOF10: 3
PAINLEVEL_OUTOF10: 1

## 2018-08-29 NOTE — LACTATION NOTE
This note was copied from a baby's chart. Introduced self to patient as Lactation RN, name and phone number written on white board in room. Infant is currently at breast with a good latch. Mother denies any questions at this time. Mother instructed to call Lactation nurse for F/U care as needed.

## 2018-08-29 NOTE — PROGRESS NOTES
Department of Obstetrics and Gynecology  Labor and Delivery  Attending Post Partum Progress Note      SUBJECTIVE:  Feels well, but tired. Baby cluster fed through the night. Perineal pain/cramping well controlled w/motrin. Tolerating regular diet, ambulating well, voiding qs. Baby is nursing well. Plan per pedi is d/c tomorrow. OBJECTIVE:      Vitals:  /72   Pulse 90   Temp 97.7 °F (36.5 °C) (Oral)   Resp 16   Ht 5' 4\" (1.626 m)   Wt 230 lb (104.3 kg)   Breastfeeding?  Unknown   BMI 39.48 kg/m²     ABDOMEN:  soft and non-tender, [email or web address]   GENITAL/URINARY:  MLR, minimal perineal edema, sutures intact  LE: No evidence of DVT, +2 non pitting edema of LE    DATA:    CBC:    Lab Results   Component Value Date    WBC 15.1 08/29/2018    RBC 3.30 08/29/2018    HGB 10.0 08/29/2018    HCT 29.4 08/29/2018    MCV 89.1 08/29/2018    RDW 12.9 08/29/2018     08/29/2018       ASSESSMENT & PLAN:      Primip s/p vaginal delivery; PROM x 28 hours  Stable nursing male infant  Continue plan of care  Lactation support prn  Anticipate discharge tomorrow

## 2018-08-29 NOTE — PLAN OF CARE
Problem: VAGINAL DELIVERY - RECOVERY AND POST PARTUM  Goal: Vital signs are medically acceptable  Outcome: Ongoing    Goal: Fundus firm at midline  Outcome: Ongoing    Goal: Edema will be absent or minimal  Outcome: Ongoing    Goal: Perineum intact without discharge or hematoma  Outcome: Ongoing      Problem: PAIN  Goal: Patient's pain/discomfort is manageable  Outcome: Ongoing

## 2018-08-30 VITALS
DIASTOLIC BLOOD PRESSURE: 88 MMHG | HEART RATE: 98 BPM | WEIGHT: 230 LBS | SYSTOLIC BLOOD PRESSURE: 138 MMHG | HEIGHT: 64 IN | BODY MASS INDEX: 39.27 KG/M2 | RESPIRATION RATE: 16 BRPM | TEMPERATURE: 98 F

## 2018-08-30 PROBLEM — D64.9 ANEMIA: Status: ACTIVE | Noted: 2018-08-30

## 2018-08-30 PROBLEM — Z37.9 NORMAL LABOR: Status: RESOLVED | Noted: 2018-08-27 | Resolved: 2018-08-30

## 2018-08-30 PROBLEM — O42.10 PROLONGED RUPTURE OF MEMBRANES, GREATER THAN 24 HOURS, DELIVERED: Status: RESOLVED | Noted: 2018-08-28 | Resolved: 2018-08-30

## 2018-08-30 PROBLEM — O42.92 FULL-TERM PREMATURE RUPTURE OF MEMBRANES: Status: RESOLVED | Noted: 2018-08-27 | Resolved: 2018-08-30

## 2018-08-30 PROCEDURE — 6370000000 HC RX 637 (ALT 250 FOR IP): Performed by: ADVANCED PRACTICE MIDWIFE

## 2018-08-30 RX ORDER — FERROUS SULFATE 325(65) MG
325 TABLET ORAL 2 TIMES DAILY WITH MEALS
Qty: 30 TABLET | Refills: 3 | COMMUNITY
Start: 2018-08-30 | End: 2019-04-02

## 2018-08-30 RX ORDER — IBUPROFEN 800 MG/1
800 TABLET ORAL EVERY 8 HOURS
Qty: 30 TABLET | Refills: 0 | Status: SHIPPED | OUTPATIENT
Start: 2018-08-30 | End: 2019-04-02

## 2018-08-30 RX ADMIN — IBUPROFEN 800 MG: 800 TABLET ORAL at 01:11

## 2018-08-30 RX ADMIN — IBUPROFEN 800 MG: 800 TABLET ORAL at 09:42

## 2018-08-30 RX ADMIN — DOCUSATE SODIUM 100 MG: 100 CAPSULE, LIQUID FILLED ORAL at 09:42

## 2018-08-30 ASSESSMENT — PAIN SCALES - GENERAL: PAINLEVEL_OUTOF10: 1

## 2018-08-30 NOTE — PLAN OF CARE
Problem: VAGINAL DELIVERY - RECOVERY AND POST PARTUM  Goal: Vital signs are medically acceptable  Outcome: Ongoing    Goal: Patient will remain free of falls  Outcome: Ongoing    Goal: Fundus firm at midline  Outcome: Ongoing    Goal: Empties bladder  Outcome: Ongoing    Goal: Positive Mother-Baby interactions are observed  Outcome: Ongoing      Problem: PAIN  Goal: Patient's pain/discomfort is manageable  Outcome: Ongoing

## 2018-08-30 NOTE — FLOWSHEET NOTE
Discharge Phone Call Log  Patient Name: Carla Failing     [facility] Care Provider: Farrah Cabrera MD Discharge Date: 2018    Disposition of baby:    Phone Number: [phone] (home)     Attempts to Contact:  Date:    Nurse  Date:    Nurse  Date:    Nurse    Introduce yourself and explain the questionnaire. 1.  Now that you are at home is your pain being well controlled? Y/N   What pain reducing measures are you using? ____________________________________        Information for the patient's :  Naida Kalia [[id number]]   Delivery Method: Vaginal, Spontaneous Delivery    2. Are you having any infant feeding issues? Y/N _____________________________      If breastfeeding, were you satisfied with the breastfeeding support services offered? Y/N  3. Any engorgement problems? Y/N  Have you had to supplement? Y/N  4. Have you made or have you already had your first appointment with the baby's doctor? Y/N If no, do you know when to schedule it? Y/N Do you have a safe crib, bassinet or play yard with a firm mattress for your infant to sleep in at home? Y/N  5. Have you scheduled your follow-up appointment? Y/N  If no, do you know when to schedule it? Y/N  6. Did your nurses and physicians include you in the plan of care, communicating with      you respectfully, and in a manner easy to understand? Y/N       Comments:  ___________________________________________________________  7. Is there anyone in particular you would like to mention who provided care for you?          ____________________________    8. Do you have any questions about your discharge instructions or the notebook? Y/N    9. Did your discharge occur in a timely manner? Y/N        Comments: __________________________________________________________    Remember to describe the hospital survey and ask patient to return it when possible.   Questions During Interview:__________________________________________________________  Teaching During interview :___________________________________________________________  ___________________________RN       Date:______________Time:________________

## 2018-08-30 NOTE — DISCHARGE SUMMARY
Obstetric Discharge Summary    Admitting Diagnosis  IUP 41.3 weeks  OB History      Para Term  AB Living    1 1 1 0 0 1    SAB TAB Ectopic Molar Multiple Live Births    0 0 0 0 0 1          Reasons for Admission on 2018  6:24 AM  Normal labor [O80, Z37.9]  No comment available  Onset of Labor PROM    Prenatal Procedures  None    Intrapartum Procedures                 Spontaneous Vaginal Delivery: See Labor and Delivery Summary       Postpartum Procedures  None    Postpartum/Operative Complications        Data  Information for the patient's :  Kenisha Ghotra [[id number]]   male  Birth Weight: 8 lb 0.9 oz (3.655 kg)    Discharge With Mother  Complications: No    Discharge Diagnosis       Discharge Information  Current Discharge Medication List      START taking these medications    Details   ibuprofen (ADVIL;MOTRIN) 800 MG tablet Take 1 tablet by mouth every 8 hours  Qty: 30 tablet, Refills: 0      benzocaine-menthol (DERMOPLAST) 20-0.5 % AERO spray Apply topically as needed for Pain  Refills: 0      witch hazel-glycerin (TUCKS) pad Place rectally as needed. Refills: 4      ferrous sulfate 325 (65 Fe) MG tablet Take 1 tablet by mouth 2 times daily (with meals)  Qty: 30 tablet, Refills: 3         CONTINUE these medications which have NOT CHANGED    Details   Prenatal MV-Min-Fe Fum-FA-DHA (PRENATAL 1 PO) Take by mouth      docusate sodium (COLACE) 100 MG capsule Take 100 mg by mouth 2 times daily             No discharge procedures on file. Discharge to: Home  Follow up in 6 weeks at office. Accompanied by . Discharge Date:18      Comments    Ready for discharge. Breastfeeding going well. Appetite good. Voiding without difficulty. Lochia small/mod amount. VSS.  Afebrile  Abdomen soft, NT, FF  Perineum with sutures intact  Labs: 10.0/29.4    A: Stable pp day 2   Anemia   Stable male infant s/p circumcision    P: D/C home today   RTO 6 weeks/call prn    S. Sterling Snellen

## 2019-03-16 ENCOUNTER — HOSPITAL ENCOUNTER (EMERGENCY)
Age: 29
Discharge: HOME | End: 2019-03-16
Attending: EMERGENCY MEDICINE
Payer: COMMERCIAL

## 2019-03-16 ENCOUNTER — APPOINTMENT (OUTPATIENT)
Dept: CT IMAGING | Age: 29
End: 2019-03-16
Payer: COMMERCIAL

## 2019-03-16 VITALS
SYSTOLIC BLOOD PRESSURE: 115 MMHG | OXYGEN SATURATION: 98 % | DIASTOLIC BLOOD PRESSURE: 62 MMHG | HEIGHT: 63 IN | HEART RATE: 83 BPM | TEMPERATURE: 98.1 F | BODY MASS INDEX: 37.21 KG/M2 | RESPIRATION RATE: 16 BRPM | WEIGHT: 210 LBS

## 2019-03-16 DIAGNOSIS — K80.50 BILIARY COLIC: ICD-10-CM

## 2019-03-16 DIAGNOSIS — K80.20 CALCULUS OF GALLBLADDER WITHOUT CHOLECYSTITIS WITHOUT OBSTRUCTION: Primary | ICD-10-CM

## 2019-03-16 DIAGNOSIS — R11.0 NAUSEA: ICD-10-CM

## 2019-03-16 LAB
A/G RATIO: 1.1 (ref 1.1–2.2)
ALBUMIN SERPL-MCNC: 4 G/DL (ref 3.4–5)
ALP BLD-CCNC: 113 U/L (ref 40–129)
ALT SERPL-CCNC: 28 U/L (ref 10–40)
ANION GAP SERPL CALCULATED.3IONS-SCNC: 11 MMOL/L (ref 3–16)
AST SERPL-CCNC: 62 U/L (ref 15–37)
BASOPHILS ABSOLUTE: 0.1 K/UL (ref 0–0.2)
BASOPHILS RELATIVE PERCENT: 1.1 %
BILIRUB SERPL-MCNC: 0.3 MG/DL (ref 0–1)
BILIRUBIN URINE: NEGATIVE
BLOOD, URINE: NEGATIVE
BUN BLDV-MCNC: 15 MG/DL (ref 7–20)
CALCIUM SERPL-MCNC: 9.2 MG/DL (ref 8.3–10.6)
CHLORIDE BLD-SCNC: 101 MMOL/L (ref 99–110)
CLARITY: CLEAR
CO2: 27 MMOL/L (ref 21–32)
COLOR: YELLOW
CREAT SERPL-MCNC: 0.7 MG/DL (ref 0.6–1.1)
EOSINOPHILS ABSOLUTE: 0.2 K/UL (ref 0–0.6)
EOSINOPHILS RELATIVE PERCENT: 2 %
GFR AFRICAN AMERICAN: >60
GFR NON-AFRICAN AMERICAN: >60
GLOBULIN: 3.5 G/DL
GLUCOSE BLD-MCNC: 109 MG/DL (ref 70–99)
GLUCOSE URINE: NEGATIVE MG/DL
HCG(URINE) PREGNANCY TEST: NEGATIVE
HCT VFR BLD CALC: 40.6 % (ref 36–48)
HEMOGLOBIN: 13.5 G/DL (ref 12–16)
KETONES, URINE: NEGATIVE MG/DL
LEUKOCYTE ESTERASE, URINE: NEGATIVE
LIPASE: 26 U/L (ref 13–60)
LYMPHOCYTES ABSOLUTE: 3.7 K/UL (ref 1–5.1)
LYMPHOCYTES RELATIVE PERCENT: 33.1 %
MCH RBC QN AUTO: 29.1 PG (ref 26–34)
MCHC RBC AUTO-ENTMCNC: 33.2 G/DL (ref 31–36)
MCV RBC AUTO: 87.7 FL (ref 80–100)
MICROSCOPIC EXAMINATION: NORMAL
MONOCYTES ABSOLUTE: 0.8 K/UL (ref 0–1.3)
MONOCYTES RELATIVE PERCENT: 7.3 %
NEUTROPHILS ABSOLUTE: 6.4 K/UL (ref 1.7–7.7)
NEUTROPHILS RELATIVE PERCENT: 56.5 %
NITRITE, URINE: NEGATIVE
PDW BLD-RTO: 14.8 % (ref 12.4–15.4)
PH UA: 7.5 (ref 5–8)
PLATELET # BLD: 284 K/UL (ref 135–450)
PMV BLD AUTO: 8 FL (ref 5–10.5)
POTASSIUM SERPL-SCNC: 4 MMOL/L (ref 3.5–5.1)
PROTEIN UA: NEGATIVE MG/DL
RBC # BLD: 4.63 M/UL (ref 4–5.2)
SODIUM BLD-SCNC: 139 MMOL/L (ref 136–145)
SPECIFIC GRAVITY UA: 1.01 (ref 1–1.03)
TOTAL PROTEIN: 7.5 G/DL (ref 6.4–8.2)
URINE TYPE: NORMAL
UROBILINOGEN, URINE: 0.2 E.U./DL
WBC # BLD: 11.3 K/UL (ref 4–11)

## 2019-03-16 PROCEDURE — 96374 THER/PROPH/DIAG INJ IV PUSH: CPT

## 2019-03-16 PROCEDURE — 2500000003 HC RX 250 WO HCPCS: Performed by: PHYSICIAN ASSISTANT

## 2019-03-16 PROCEDURE — 81003 URINALYSIS AUTO W/O SCOPE: CPT

## 2019-03-16 PROCEDURE — 96375 TX/PRO/DX INJ NEW DRUG ADDON: CPT

## 2019-03-16 PROCEDURE — 84703 CHORIONIC GONADOTROPIN ASSAY: CPT

## 2019-03-16 PROCEDURE — 83690 ASSAY OF LIPASE: CPT

## 2019-03-16 PROCEDURE — 6360000002 HC RX W HCPCS: Performed by: PHYSICIAN ASSISTANT

## 2019-03-16 PROCEDURE — 2580000003 HC RX 258: Performed by: PHYSICIAN ASSISTANT

## 2019-03-16 PROCEDURE — 6360000004 HC RX CONTRAST MEDICATION

## 2019-03-16 PROCEDURE — 6360000004 HC RX CONTRAST MEDICATION: Performed by: PHYSICIAN ASSISTANT

## 2019-03-16 PROCEDURE — 74177 CT ABD & PELVIS W/CONTRAST: CPT

## 2019-03-16 PROCEDURE — 99284 EMERGENCY DEPT VISIT MOD MDM: CPT

## 2019-03-16 PROCEDURE — 85025 COMPLETE CBC W/AUTO DIFF WBC: CPT

## 2019-03-16 PROCEDURE — 80053 COMPREHEN METABOLIC PANEL: CPT

## 2019-03-16 PROCEDURE — 96361 HYDRATE IV INFUSION ADD-ON: CPT

## 2019-03-16 RX ORDER — ONDANSETRON 2 MG/ML
4 INJECTION INTRAMUSCULAR; INTRAVENOUS EVERY 30 MIN PRN
Status: DISCONTINUED | OUTPATIENT
Start: 2019-03-16 | End: 2019-03-16 | Stop reason: HOSPADM

## 2019-03-16 RX ORDER — 0.9 % SODIUM CHLORIDE 0.9 %
1000 INTRAVENOUS SOLUTION INTRAVENOUS ONCE
Status: COMPLETED | OUTPATIENT
Start: 2019-03-16 | End: 2019-03-16

## 2019-03-16 RX ORDER — ONDANSETRON 4 MG/1
4 TABLET, FILM COATED ORAL EVERY 8 HOURS PRN
Qty: 20 TABLET | Refills: 0 | Status: SHIPPED | OUTPATIENT
Start: 2019-03-16 | End: 2019-04-02

## 2019-03-16 RX ORDER — HYDROCODONE BITARTRATE AND ACETAMINOPHEN 5; 325 MG/1; MG/1
1 TABLET ORAL EVERY 6 HOURS PRN
Qty: 10 TABLET | Refills: 0 | Status: SHIPPED | OUTPATIENT
Start: 2019-03-16 | End: 2019-03-16 | Stop reason: CLARIF

## 2019-03-16 RX ORDER — MORPHINE SULFATE 4 MG/ML
4 INJECTION, SOLUTION INTRAMUSCULAR; INTRAVENOUS ONCE
Status: COMPLETED | OUTPATIENT
Start: 2019-03-16 | End: 2019-03-16

## 2019-03-16 RX ADMIN — MORPHINE SULFATE 4 MG: 4 INJECTION INTRAVENOUS at 18:00

## 2019-03-16 RX ADMIN — ONDANSETRON 4 MG: 2 INJECTION INTRAMUSCULAR; INTRAVENOUS at 18:00

## 2019-03-16 RX ADMIN — FAMOTIDINE 20 MG: 10 INJECTION, SOLUTION INTRAVENOUS at 18:00

## 2019-03-16 RX ADMIN — IOPAMIDOL 75 ML: 755 INJECTION, SOLUTION INTRAVENOUS at 18:38

## 2019-03-16 RX ADMIN — SODIUM CHLORIDE 1000 ML: 9 INJECTION, SOLUTION INTRAVENOUS at 18:00

## 2019-03-16 ASSESSMENT — ENCOUNTER SYMPTOMS
CHEST TIGHTNESS: 0
RHINORRHEA: 0
BACK PAIN: 0
DIARRHEA: 0
ABDOMINAL PAIN: 1
NAUSEA: 1
EYE REDNESS: 0
SORE THROAT: 0
EYE PAIN: 0
SHORTNESS OF BREATH: 0
COUGH: 0
CONSTIPATION: 0
FACIAL SWELLING: 0

## 2019-03-16 ASSESSMENT — PAIN DESCRIPTION - PAIN TYPE: TYPE: ACUTE PAIN

## 2019-03-16 ASSESSMENT — PAIN DESCRIPTION - LOCATION: LOCATION: ABDOMEN

## 2019-03-16 ASSESSMENT — PAIN DESCRIPTION - ORIENTATION: ORIENTATION: MID;UPPER

## 2019-03-16 ASSESSMENT — PAIN SCALES - GENERAL
PAINLEVEL_OUTOF10: 0
PAINLEVEL_OUTOF10: 9
PAINLEVEL_OUTOF10: 9

## 2019-03-16 ASSESSMENT — PAIN DESCRIPTION - DESCRIPTORS: DESCRIPTORS: SHARP

## 2019-03-20 ENCOUNTER — HOSPITAL ENCOUNTER (OUTPATIENT)
Dept: ULTRASOUND IMAGING | Age: 29
Discharge: HOME | End: 2019-03-20
Payer: COMMERCIAL

## 2019-03-20 DIAGNOSIS — K80.50 BILIARY COLIC: ICD-10-CM

## 2019-03-20 DIAGNOSIS — K80.20 CALCULUS OF GALLBLADDER WITHOUT CHOLECYSTITIS WITHOUT OBSTRUCTION: ICD-10-CM

## 2019-03-20 PROCEDURE — 76705 ECHO EXAM OF ABDOMEN: CPT

## 2019-03-21 ENCOUNTER — INITIAL CONSULT (OUTPATIENT)
Dept: SURGERY | Age: 29
End: 2019-03-21
Payer: COMMERCIAL

## 2019-03-21 VITALS
SYSTOLIC BLOOD PRESSURE: 124 MMHG | BODY MASS INDEX: 36.86 KG/M2 | WEIGHT: 208 LBS | HEIGHT: 63 IN | DIASTOLIC BLOOD PRESSURE: 72 MMHG

## 2019-03-21 DIAGNOSIS — K80.20 GALLSTONES: Primary | ICD-10-CM

## 2019-03-21 PROCEDURE — 99242 OFF/OP CONSLTJ NEW/EST SF 20: CPT | Performed by: SURGERY

## 2019-04-02 NOTE — PRE-PROCEDURE INSTRUCTIONS

## 2019-04-02 NOTE — PROGRESS NOTES
PAT completed with patient no orders with chart or in Epic will check with MD ALFARO. Daryle Farrier

## 2019-04-05 ENCOUNTER — ANESTHESIA (OUTPATIENT)
Dept: OPERATING ROOM | Age: 29
End: 2019-04-05
Payer: COMMERCIAL

## 2019-04-05 ENCOUNTER — ANESTHESIA EVENT (OUTPATIENT)
Dept: OPERATING ROOM | Age: 29
End: 2019-04-05
Payer: COMMERCIAL

## 2019-04-05 ENCOUNTER — HOSPITAL ENCOUNTER (OUTPATIENT)
Age: 29
Setting detail: OUTPATIENT SURGERY
Discharge: HOME | End: 2019-04-05
Attending: SURGERY | Admitting: SURGERY
Payer: COMMERCIAL

## 2019-04-05 VITALS
RESPIRATION RATE: 3 BRPM | DIASTOLIC BLOOD PRESSURE: 90 MMHG | OXYGEN SATURATION: 100 % | SYSTOLIC BLOOD PRESSURE: 123 MMHG

## 2019-04-05 VITALS
TEMPERATURE: 97 F | DIASTOLIC BLOOD PRESSURE: 80 MMHG | WEIGHT: 208 LBS | RESPIRATION RATE: 14 BRPM | HEIGHT: 63 IN | SYSTOLIC BLOOD PRESSURE: 121 MMHG | BODY MASS INDEX: 36.86 KG/M2 | HEART RATE: 76 BPM | OXYGEN SATURATION: 96 %

## 2019-04-05 DIAGNOSIS — K80.20 GALLSTONES: Primary | ICD-10-CM

## 2019-04-05 LAB — PREGNANCY, URINE: NEGATIVE

## 2019-04-05 PROCEDURE — 7100000010 HC PHASE II RECOVERY - FIRST 15 MIN: Performed by: SURGERY

## 2019-04-05 PROCEDURE — 6360000002 HC RX W HCPCS: Performed by: SURGERY

## 2019-04-05 PROCEDURE — 88304 TISSUE EXAM BY PATHOLOGIST: CPT

## 2019-04-05 PROCEDURE — 2580000003 HC RX 258: Performed by: ANESTHESIOLOGY

## 2019-04-05 PROCEDURE — 2500000003 HC RX 250 WO HCPCS: Performed by: NURSE ANESTHETIST, CERTIFIED REGISTERED

## 2019-04-05 PROCEDURE — 2709999900 HC NON-CHARGEABLE SUPPLY: Performed by: SURGERY

## 2019-04-05 PROCEDURE — 2720000010 HC SURG SUPPLY STERILE: Performed by: SURGERY

## 2019-04-05 PROCEDURE — 7100000011 HC PHASE II RECOVERY - ADDTL 15 MIN: Performed by: SURGERY

## 2019-04-05 PROCEDURE — 7100000001 HC PACU RECOVERY - ADDTL 15 MIN: Performed by: SURGERY

## 2019-04-05 PROCEDURE — 6360000002 HC RX W HCPCS: Performed by: NURSE ANESTHETIST, CERTIFIED REGISTERED

## 2019-04-05 PROCEDURE — 84703 CHORIONIC GONADOTROPIN ASSAY: CPT

## 2019-04-05 PROCEDURE — 6370000000 HC RX 637 (ALT 250 FOR IP): Performed by: ANESTHESIOLOGY

## 2019-04-05 PROCEDURE — 2500000003 HC RX 250 WO HCPCS: Performed by: SURGERY

## 2019-04-05 PROCEDURE — 3700000000 HC ANESTHESIA ATTENDED CARE: Performed by: SURGERY

## 2019-04-05 PROCEDURE — 3700000001 HC ADD 15 MINUTES (ANESTHESIA): Performed by: SURGERY

## 2019-04-05 PROCEDURE — 2580000003 HC RX 258: Performed by: SURGERY

## 2019-04-05 PROCEDURE — 3600000004 HC SURGERY LEVEL 4 BASE: Performed by: SURGERY

## 2019-04-05 PROCEDURE — 7100000000 HC PACU RECOVERY - FIRST 15 MIN: Performed by: SURGERY

## 2019-04-05 PROCEDURE — 6370000000 HC RX 637 (ALT 250 FOR IP): Performed by: NURSE ANESTHETIST, CERTIFIED REGISTERED

## 2019-04-05 PROCEDURE — 2500000003 HC RX 250 WO HCPCS: Performed by: ANESTHESIOLOGY

## 2019-04-05 PROCEDURE — 6360000002 HC RX W HCPCS: Performed by: ANESTHESIOLOGY

## 2019-04-05 PROCEDURE — 3600000014 HC SURGERY LEVEL 4 ADDTL 15MIN: Performed by: SURGERY

## 2019-04-05 PROCEDURE — 47562 LAPAROSCOPIC CHOLECYSTECTOMY: CPT | Performed by: SURGERY

## 2019-04-05 RX ORDER — BUPIVACAINE HYDROCHLORIDE 5 MG/ML
INJECTION, SOLUTION EPIDURAL; INTRACAUDAL PRN
Status: DISCONTINUED | OUTPATIENT
Start: 2019-04-05 | End: 2019-04-05 | Stop reason: ALTCHOICE

## 2019-04-05 RX ORDER — OXYCODONE HYDROCHLORIDE AND ACETAMINOPHEN 5; 325 MG/1; MG/1
1 TABLET ORAL PRN
Status: COMPLETED | OUTPATIENT
Start: 2019-04-05 | End: 2019-04-05

## 2019-04-05 RX ORDER — ONDANSETRON 2 MG/ML
4 INJECTION INTRAMUSCULAR; INTRAVENOUS
Status: COMPLETED | OUTPATIENT
Start: 2019-04-05 | End: 2019-04-05

## 2019-04-05 RX ORDER — HEPARIN SODIUM 5000 [USP'U]/ML
5000 INJECTION, SOLUTION INTRAVENOUS; SUBCUTANEOUS ONCE
Status: COMPLETED | OUTPATIENT
Start: 2019-04-05 | End: 2019-04-05

## 2019-04-05 RX ORDER — KETOROLAC TROMETHAMINE 30 MG/ML
INJECTION, SOLUTION INTRAMUSCULAR; INTRAVENOUS PRN
Status: DISCONTINUED | OUTPATIENT
Start: 2019-04-05 | End: 2019-04-05 | Stop reason: SDUPTHER

## 2019-04-05 RX ORDER — ROCURONIUM BROMIDE 10 MG/ML
INJECTION, SOLUTION INTRAVENOUS PRN
Status: DISCONTINUED | OUTPATIENT
Start: 2019-04-05 | End: 2019-04-05 | Stop reason: SDUPTHER

## 2019-04-05 RX ORDER — OXYCODONE HYDROCHLORIDE AND ACETAMINOPHEN 5; 325 MG/1; MG/1
2 TABLET ORAL PRN
Status: COMPLETED | OUTPATIENT
Start: 2019-04-05 | End: 2019-04-05

## 2019-04-05 RX ORDER — LIDOCAINE HYDROCHLORIDE 10 MG/ML
2 INJECTION, SOLUTION EPIDURAL; INFILTRATION; INTRACAUDAL; PERINEURAL
Status: COMPLETED | OUTPATIENT
Start: 2019-04-05 | End: 2019-04-05

## 2019-04-05 RX ORDER — HYDROMORPHONE HCL 110MG/55ML
0.5 PATIENT CONTROLLED ANALGESIA SYRINGE INTRAVENOUS EVERY 5 MIN PRN
Status: DISCONTINUED | OUTPATIENT
Start: 2019-04-05 | End: 2019-04-05 | Stop reason: HOSPADM

## 2019-04-05 RX ORDER — PROMETHAZINE HYDROCHLORIDE 25 MG/ML
6.25 INJECTION, SOLUTION INTRAMUSCULAR; INTRAVENOUS
Status: DISCONTINUED | OUTPATIENT
Start: 2019-04-05 | End: 2019-04-05 | Stop reason: HOSPADM

## 2019-04-05 RX ORDER — ONDANSETRON 2 MG/ML
INJECTION INTRAMUSCULAR; INTRAVENOUS PRN
Status: DISCONTINUED | OUTPATIENT
Start: 2019-04-05 | End: 2019-04-05 | Stop reason: SDUPTHER

## 2019-04-05 RX ORDER — OXYCODONE HYDROCHLORIDE AND ACETAMINOPHEN 5; 325 MG/1; MG/1
1 TABLET ORAL EVERY 6 HOURS PRN
Qty: 28 TABLET | Refills: 0 | Status: SHIPPED | OUTPATIENT
Start: 2019-04-05 | End: 2019-04-12

## 2019-04-05 RX ORDER — DIPHENHYDRAMINE HYDROCHLORIDE 50 MG/ML
12.5 INJECTION INTRAMUSCULAR; INTRAVENOUS
Status: DISCONTINUED | OUTPATIENT
Start: 2019-04-05 | End: 2019-04-05 | Stop reason: HOSPADM

## 2019-04-05 RX ORDER — MAGNESIUM HYDROXIDE 1200 MG/15ML
LIQUID ORAL CONTINUOUS PRN
Status: COMPLETED | OUTPATIENT
Start: 2019-04-05 | End: 2019-04-05

## 2019-04-05 RX ORDER — MIDAZOLAM HYDROCHLORIDE 1 MG/ML
INJECTION INTRAMUSCULAR; INTRAVENOUS PRN
Status: DISCONTINUED | OUTPATIENT
Start: 2019-04-05 | End: 2019-04-05 | Stop reason: SDUPTHER

## 2019-04-05 RX ORDER — LIDOCAINE HYDROCHLORIDE 40 MG/ML
SOLUTION TOPICAL PRN
Status: DISCONTINUED | OUTPATIENT
Start: 2019-04-05 | End: 2019-04-05 | Stop reason: SDUPTHER

## 2019-04-05 RX ORDER — HYDRALAZINE HYDROCHLORIDE 20 MG/ML
5 INJECTION INTRAMUSCULAR; INTRAVENOUS EVERY 10 MIN PRN
Status: DISCONTINUED | OUTPATIENT
Start: 2019-04-05 | End: 2019-04-05 | Stop reason: HOSPADM

## 2019-04-05 RX ORDER — MORPHINE SULFATE 10 MG/ML
2 INJECTION, SOLUTION INTRAMUSCULAR; INTRAVENOUS EVERY 5 MIN PRN
Status: DISCONTINUED | OUTPATIENT
Start: 2019-04-05 | End: 2019-04-05 | Stop reason: HOSPADM

## 2019-04-05 RX ORDER — LABETALOL HYDROCHLORIDE 5 MG/ML
5 INJECTION, SOLUTION INTRAVENOUS EVERY 10 MIN PRN
Status: DISCONTINUED | OUTPATIENT
Start: 2019-04-05 | End: 2019-04-05 | Stop reason: HOSPADM

## 2019-04-05 RX ORDER — PROPOFOL 10 MG/ML
INJECTION, EMULSION INTRAVENOUS PRN
Status: DISCONTINUED | OUTPATIENT
Start: 2019-04-05 | End: 2019-04-05 | Stop reason: SDUPTHER

## 2019-04-05 RX ORDER — MORPHINE SULFATE 10 MG/ML
1 INJECTION, SOLUTION INTRAMUSCULAR; INTRAVENOUS EVERY 5 MIN PRN
Status: DISCONTINUED | OUTPATIENT
Start: 2019-04-05 | End: 2019-04-05 | Stop reason: HOSPADM

## 2019-04-05 RX ORDER — HYDROMORPHONE HCL 110MG/55ML
0.25 PATIENT CONTROLLED ANALGESIA SYRINGE INTRAVENOUS EVERY 5 MIN PRN
Status: DISCONTINUED | OUTPATIENT
Start: 2019-04-05 | End: 2019-04-05 | Stop reason: HOSPADM

## 2019-04-05 RX ORDER — SODIUM CHLORIDE, SODIUM LACTATE, POTASSIUM CHLORIDE, AND CALCIUM CHLORIDE .6; .31; .03; .02 G/100ML; G/100ML; G/100ML; G/100ML
IRRIGANT IRRIGATION PRN
Status: DISCONTINUED | OUTPATIENT
Start: 2019-04-05 | End: 2019-04-05 | Stop reason: ALTCHOICE

## 2019-04-05 RX ORDER — DEXAMETHASONE SODIUM PHOSPHATE 4 MG/ML
INJECTION, SOLUTION INTRA-ARTICULAR; INTRALESIONAL; INTRAMUSCULAR; INTRAVENOUS; SOFT TISSUE PRN
Status: DISCONTINUED | OUTPATIENT
Start: 2019-04-05 | End: 2019-04-05 | Stop reason: SDUPTHER

## 2019-04-05 RX ORDER — SODIUM CHLORIDE, SODIUM LACTATE, POTASSIUM CHLORIDE, CALCIUM CHLORIDE 600; 310; 30; 20 MG/100ML; MG/100ML; MG/100ML; MG/100ML
INJECTION, SOLUTION INTRAVENOUS CONTINUOUS
Status: DISCONTINUED | OUTPATIENT
Start: 2019-04-05 | End: 2019-04-05 | Stop reason: HOSPADM

## 2019-04-05 RX ORDER — MEPERIDINE HYDROCHLORIDE 25 MG/ML
12.5 INJECTION INTRAMUSCULAR; INTRAVENOUS; SUBCUTANEOUS EVERY 5 MIN PRN
Status: DISCONTINUED | OUTPATIENT
Start: 2019-04-05 | End: 2019-04-05 | Stop reason: HOSPADM

## 2019-04-05 RX ORDER — FENTANYL CITRATE 50 UG/ML
INJECTION, SOLUTION INTRAMUSCULAR; INTRAVENOUS PRN
Status: DISCONTINUED | OUTPATIENT
Start: 2019-04-05 | End: 2019-04-05 | Stop reason: SDUPTHER

## 2019-04-05 RX ORDER — LIDOCAINE HYDROCHLORIDE 20 MG/ML
INJECTION, SOLUTION INFILTRATION; PERINEURAL PRN
Status: DISCONTINUED | OUTPATIENT
Start: 2019-04-05 | End: 2019-04-05 | Stop reason: SDUPTHER

## 2019-04-05 RX ADMIN — PROPOFOL 200 MG: 10 INJECTION, EMULSION INTRAVENOUS at 11:45

## 2019-04-05 RX ADMIN — KETOROLAC TROMETHAMINE 30 MG: 30 INJECTION, SOLUTION INTRAMUSCULAR; INTRAVENOUS at 12:03

## 2019-04-05 RX ADMIN — ONDANSETRON 4 MG: 2 INJECTION, SOLUTION INTRAMUSCULAR; INTRAVENOUS at 11:54

## 2019-04-05 RX ADMIN — OXYCODONE AND ACETAMINOPHEN 1 TABLET: 5; 325 TABLET ORAL at 12:41

## 2019-04-05 RX ADMIN — SODIUM CHLORIDE, POTASSIUM CHLORIDE, SODIUM LACTATE AND CALCIUM CHLORIDE: 600; 310; 30; 20 INJECTION, SOLUTION INTRAVENOUS at 11:20

## 2019-04-05 RX ADMIN — MIDAZOLAM 2 MG: 1 INJECTION INTRAMUSCULAR; INTRAVENOUS at 11:41

## 2019-04-05 RX ADMIN — FENTANYL CITRATE 50 MCG: 50 INJECTION, SOLUTION INTRAMUSCULAR; INTRAVENOUS at 11:41

## 2019-04-05 RX ADMIN — HEPARIN SODIUM 5000 UNITS: 5000 INJECTION, SOLUTION INTRAVENOUS; SUBCUTANEOUS at 11:20

## 2019-04-05 RX ADMIN — ROCURONIUM BROMIDE 50 MG: 10 INJECTION, SOLUTION INTRAVENOUS at 11:45

## 2019-04-05 RX ADMIN — LIDOCAINE HYDROCHLORIDE 60 MG: 20 INJECTION, SOLUTION INFILTRATION; PERINEURAL at 11:45

## 2019-04-05 RX ADMIN — HYDROMORPHONE HYDROCHLORIDE 0.5 MG: 2 INJECTION, SOLUTION INTRAMUSCULAR; INTRAVENOUS; SUBCUTANEOUS at 12:41

## 2019-04-05 RX ADMIN — SODIUM CHLORIDE, POTASSIUM CHLORIDE, SODIUM LACTATE AND CALCIUM CHLORIDE: 600; 310; 30; 20 INJECTION, SOLUTION INTRAVENOUS at 11:41

## 2019-04-05 RX ADMIN — LIDOCAINE HYDROCHLORIDE 2 ML: 10 INJECTION, SOLUTION EPIDURAL; INFILTRATION; INTRACAUDAL; PERINEURAL at 11:20

## 2019-04-05 RX ADMIN — ONDANSETRON 4 MG: 2 INJECTION INTRAMUSCULAR; INTRAVENOUS at 12:25

## 2019-04-05 RX ADMIN — LIDOCAINE HYDROCHLORIDE 4 ML: 40 SPRAY LARYNGEAL; TRANSTRACHEAL at 11:47

## 2019-04-05 RX ADMIN — FENTANYL CITRATE 100 MCG: 50 INJECTION, SOLUTION INTRAMUSCULAR; INTRAVENOUS at 11:45

## 2019-04-05 RX ADMIN — DEXAMETHASONE SODIUM PHOSPHATE 8 MG: 4 INJECTION, SOLUTION INTRAMUSCULAR; INTRAVENOUS at 11:54

## 2019-04-05 RX ADMIN — HYDROMORPHONE HYDROCHLORIDE 0.5 MG: 2 INJECTION, SOLUTION INTRAMUSCULAR; INTRAVENOUS; SUBCUTANEOUS at 12:25

## 2019-04-05 RX ADMIN — SUGAMMADEX 200 MG: 100 INJECTION, SOLUTION INTRAVENOUS at 12:10

## 2019-04-05 ASSESSMENT — PAIN DESCRIPTION - PAIN TYPE: TYPE: SURGICAL PAIN

## 2019-04-05 ASSESSMENT — PULMONARY FUNCTION TESTS
PIF_VALUE: 14
PIF_VALUE: 3
PIF_VALUE: 17
PIF_VALUE: 28
PIF_VALUE: 24
PIF_VALUE: 1
PIF_VALUE: 15
PIF_VALUE: 6
PIF_VALUE: 20
PIF_VALUE: 24
PIF_VALUE: 16
PIF_VALUE: 24
PIF_VALUE: 1
PIF_VALUE: 15
PIF_VALUE: 24
PIF_VALUE: 24
PIF_VALUE: 2
PIF_VALUE: 19
PIF_VALUE: 20
PIF_VALUE: 38
PIF_VALUE: 15
PIF_VALUE: 17
PIF_VALUE: 24
PIF_VALUE: 15
PIF_VALUE: 17
PIF_VALUE: 2
PIF_VALUE: 23
PIF_VALUE: 24
PIF_VALUE: 1

## 2019-04-05 ASSESSMENT — PAIN SCALES - GENERAL
PAINLEVEL_OUTOF10: 2
PAINLEVEL_OUTOF10: 5
PAINLEVEL_OUTOF10: 4
PAINLEVEL_OUTOF10: 5
PAINLEVEL_OUTOF10: 3

## 2019-04-05 ASSESSMENT — PAIN DESCRIPTION - LOCATION: LOCATION: ABDOMEN

## 2019-04-05 ASSESSMENT — PAIN - FUNCTIONAL ASSESSMENT: PAIN_FUNCTIONAL_ASSESSMENT: 0-10

## 2019-04-05 NOTE — PROGRESS NOTES
Lilian Opioid-induced Sedation Scale (POSS)    Check the box that best describes the patient at this time:    [] S= Sleep, easy to arouse  [x] 1= Awake and alert  [] 2= Slightly drowsy, easily aroused  Status:  Acceptable  Intervention:   No action necessary    [] 3=Frequently drowsy, arousable, drifts off to sleep during conversation  Status:  Unacceptable  Intervention:  Monitor respiratory status and sedation level every 15 minutes until sedation level is stable (at less than 3); notify anesthesiologist prior to administering further opioid medication(s). [] 4= Minimal or no response to verbal or physical stimulation  Status:  Unacceptable  Intervention:  Monitor respiratory status and sedation level every 15 minutes until sedation level is stable (at less than 3); notify anesthesiologist immediately.     Bandar Robin RN 4/5/2019

## 2019-04-05 NOTE — PROGRESS NOTES
Report from BETSY Parmar and CRNA. Pt arrived to PACU from OR. See doc flow for vitals and assessment. Will monitor.

## 2019-04-05 NOTE — PROGRESS NOTES
Lilian Opioid-induced Sedation Scale (POSS)    Check the box that best describes the patient at this time:    [] S= Sleep, easy to arouse  [x] 1= Awake and alert  [] 2= Slightly drowsy, easily aroused  Status:  Acceptable  Intervention:   No action necessary    [] 3=Frequently drowsy, arousable, drifts off to sleep during conversation  Status:  Unacceptable  Intervention:  Monitor respiratory status and sedation level every 15 minutes until sedation level is stable (at less than 3); notify anesthesiologist prior to administering further opioid medication(s). [] 4= Minimal or no response to verbal or physical stimulation  Status:  Unacceptable  Intervention:  Monitor respiratory status and sedation level every 15 minutes until sedation level is stable (at less than 3); notify anesthesiologist immediately.     Marina Childress RN 4/5/2019

## 2019-04-05 NOTE — H&P
I have reviewed the progress note serving as history and physical dated March/21/ 2019 and examined the patient and find no relevant changes. I have reviewed with the patient and/or family the risks, benefits, and alternatives to the procedure.

## 2019-04-05 NOTE — ANESTHESIA PRE PROCEDURE
Department of Anesthesiology  Preprocedure Note       Name:  Son Mcdowell   Age:  34 y.o.  :  1990                                          MRN:  [id number]         Date:  2019      Surgeon: Sumi Villegas):  Sari Ann MD    Procedure: CHOLECYSTECTOMY LAPAROSCOPIC (N/A )    Medications prior to admission:   Prior to Admission medications    Medication Sig Start Date End Date Taking? Authorizing Provider   Prenatal MV-Min-Fe Fum-FA-DHA (PRENATAL 1 PO) Take by mouth daily    Yes Historical Provider, MD   docusate sodium (COLACE) 100 MG capsule Take 100 mg by mouth 2 times daily   Yes Historical Provider, MD       Current medications:    Current Facility-Administered Medications   Medication Dose Route Frequency Provider Last Rate Last Dose    lidocaine PF 1 % injection 2 mL  2 mL Intradermal Once PRN Arnold Sibley MD        lactated ringers infusion   Intravenous Continuous Arnold Sibley MD        heparin (porcine) injection 5,000 Units  5,000 Units Subcutaneous Once Sari Ann MD           Allergies:  No Known Allergies    Problem List:    Patient Active Problem List   Diagnosis Code    Anemia D64.9       Past Medical History:        Diagnosis Date    Raynaud phenomenon     Scleroderma ([address]        Past Surgical History:        Procedure Laterality Date    WISDOM TOOTH EXTRACTION         Social History:    Social History     Tobacco Use    Smoking status: Never Smoker    Smokeless tobacco: Never Used   Substance Use Topics    Alcohol use:  No                                Counseling given: Not Answered      Vital Signs (Current):   Vitals:    19 1537 19 1112   BP:  130/73   Pulse:  97   Resp:  16   Temp:  98.1 °F (36.7 °C)   TempSrc:  Temporal   SpO2:  97%   Weight: 208 lb (94.3 kg)    Height: 5' 3\" (1.6 m)                                               BP Readings from Last 3 Encounters:   19 130/73   19 124/72   19 115/62       NPO Status: Time of last liquid consumption: 0000                        Time of last solid consumption: 0000                        Date of last liquid consumption: 04/04/19                        Date of last solid food consumption: 04/04/19    BMI:   Wt Readings from Last 3 Encounters:   04/02/19 208 lb (94.3 kg)   03/21/19 208 lb (94.3 kg)   03/16/19 210 lb (95.3 kg)     Body mass index is 36.85 kg/m². CBC:   Lab Results   Component Value Date    WBC 11.3 03/16/2019    RBC 4.63 03/16/2019    HGB 13.5 03/16/2019    HCT 40.6 03/16/2019    MCV 87.7 03/16/2019    RDW 14.8 03/16/2019     03/16/2019       CMP:   Lab Results   Component Value Date     03/16/2019    K 4.0 03/16/2019     03/16/2019    CO2 27 03/16/2019    BUN 15 03/16/2019    CREATININE 0.7 03/16/2019    GFRAA >60 03/16/2019    AGRATIO 1.1 03/16/2019    LABGLOM >60 03/16/2019    GLUCOSE 109 03/16/2019    PROT 7.5 03/16/2019    CALCIUM 9.2 03/16/2019    BILITOT 0.3 03/16/2019    ALKPHOS 113 03/16/2019    AST 62 03/16/2019    ALT 28 03/16/2019       POC Tests: No results for input(s): POCGLU, POCNA, POCK, POCCL, POCBUN, POCHEMO, POCHCT in the last 72 hours. Coags: No results found for: PROTIME, INR, APTT    HCG (If Applicable):   Lab Results   Component Value Date    PREGTESTUR Negative 03/16/2019        ABGs: No results found for: PHART, PO2ART, WOU8DWJ, KBH8FAH, BEART, L3QIZGJY     Type & Screen (If Applicable):  No results found for: LABABO, LABRH    Anesthesia Evaluation   no history of anesthetic complications:   Airway: Mallampati: II  TM distance: >3 FB   Neck ROM: full  Mouth opening: > = 3 FB Dental: normal exam         Pulmonary:Negative Pulmonary ROS                              Cardiovascular:Negative CV ROS                      Neuro/Psych:   Negative Neuro/Psych ROS              GI/Hepatic/Renal:            ROS comment: cholecystitis.    Endo/Other: Negative Endo/Other ROS                    Abdominal:           Vascular: negative vascular ROS. Pre-Operative Diagnosis: GALLSTONES    34 y.o.   BMI:  Body mass index is 36.85 kg/m². Vitals:    04/02/19 1537 04/05/19 1112   BP:  130/73   Pulse:  97   Resp:  16   Temp:  98.1 °F (36.7 °C)   TempSrc:  Temporal   SpO2:  97%   Weight: 208 lb (94.3 kg)    Height: 5' 3\" (1.6 m)        No Known Allergies    Social History     Tobacco Use    Smoking status: Never Smoker    Smokeless tobacco: Never Used   Substance Use Topics    Alcohol use: No       LABS:    CBC  Lab Results   Component Value Date/Time    WBC 11.3 (H) 03/16/2019 05:49 PM    HGB 13.5 03/16/2019 05:49 PM    HCT 40.6 03/16/2019 05:49 PM     03/16/2019 05:49 PM     RENAL  Lab Results   Component Value Date/Time     03/16/2019 05:49 PM    K 4.0 03/16/2019 05:49 PM     03/16/2019 05:49 PM    CO2 27 03/16/2019 05:49 PM    BUN 15 03/16/2019 05:49 PM    CREATININE 0.7 03/16/2019 05:49 PM    GLUCOSE 109 (H) 03/16/2019 05:49 PM     COAGS  No results found for: PROTIME, INR, APTT     Anesthesia Plan      general     ASA 1     (Pt agrees to risks, benefits and alternatives of GETA. Questions answered. Willing to proceed with plan.)  Induction: intravenous. Anesthetic plan and risks discussed with patient.                       Flora Kitchen MD   4/5/2019

## 2019-04-05 NOTE — ANESTHESIA POSTPROCEDURE EVALUATION
Department of Anesthesiology  Postprocedure Note    Patient: Yaritza Huynh  MRN: [id number]  YOB: 1990  Date of evaluation: 4/5/2019  Time:  2:05 PM     Procedure Summary     Date:  04/05/19 Room / Location:  [facility] 05 / [facility] OR    Anesthesia Start:  7331 Anesthesia Stop:  0211    Procedure:  CHOLECYSTECTOMY LAPAROSCOPIC (N/A Abdomen) Diagnosis:  (GALLSTONES)    Surgeon:  Nicole Vogt MD Responsible Provider:  Caro Juarez MD    Anesthesia Type:  general ASA Status:  1          Anesthesia Type: general    Chandu Phase I: Chandu Score: 10    Chandu Phase II: Chandu Score: 10    Last vitals: Reviewed and per EMR flowsheets.        Anesthesia Post Evaluation    Patient location during evaluation: PACU  Patient participation: complete - patient participated  Level of consciousness: awake and alert  Airway patency: patent  Nausea & Vomiting: no nausea and no vomiting  Complications: no  Cardiovascular status: blood pressure returned to baseline  Respiratory status: acceptable  Hydration status: euvolemic

## 2019-04-06 NOTE — OP NOTE
[address]                 [address] [facility] [address]                                OPERATIVE REPORT    PATIENT NAME: Gabi Castillo                     :        1990  MED REC NO:   [id number]                          ROOM:  ACCOUNT NO:   [de-identified]                           ADMIT DATE: 2019  PROVIDER:     Lvei Do MD    DATE OF PROCEDURE:  2019    PREOPERATIVE DIAGNOSIS:  Gallstones. POSTOPERATIVE DIAGNOSIS:  Gallstones. PROCEDURE:  Laparoscopic cholecystectomy. SURGEON:  Levi Do MD    ANESTHESIA:  General.    INDICATIONS:  The patient is a 29-year-old woman, who presented with  abdominal pain and was found to have gallstones. OPERATIVE SUMMARY:  After preoperative evaluation, the patient was  brought in the operating suite and placed in a comfortable supine  position on the operating room table. Monitoring equipment was attached  and general anesthesia was induced. Her abdomen was sterilely prepped  and draped, and a small incision was made at the superior aspect of the  umbilicus. This was dissected down to the fascia and a suture of  0-Ethibond was placed on either side of the midline. The midline fascia  was opened and the peritoneal cavity was entered directly. A Lea  trocar was inserted and the abdomen was insufflated to a pressure of 15  mmHg. The remaining ports were placed under direct internal  visualization. The gallbladder was grasped and elevated cephalad over  the liver edge. The infundibular structures were dissected out, and the  cystic duct and artery were identified. Each was tracked down to its  origin, but triply clipped, and divided. The gallbladder was then  dissected free of the liver bed using electrocautery and withdrawn  through subxiphoid incision. The ports were reinserted and the  gallbladder fossa was examined. There was no evidence of bleeding.    There was no bile leakage. The clips were intact in the proximal  structures. The ports were therefore removed. The umbilical fascial  defect was closed with a figure-of-eight suture of 0-Ethibond. The  wounds were injected with Marcaine and the skin incisions closed with  subcuticular sutures of 4-0 Vicryl followed by Benzoin, Steri-Strips,  and dry sterile dressings. All sponge, needle, and instrument counts  were correct at the end of the case. The patient tolerated the  procedure well and was taken to the recovery area in stable condition.         Jenni Romero MD    D: 04/05/2019 12:22:39       T: 04/05/2019 20:11:43     [id number]  Job#: [id number]     Doc#: [id number]    CC:  Federica De Los Santos CNP

## 2019-04-23 ENCOUNTER — OFFICE VISIT (OUTPATIENT)
Dept: SURGERY | Age: 29
End: 2019-04-23

## 2019-04-23 VITALS
DIASTOLIC BLOOD PRESSURE: 72 MMHG | SYSTOLIC BLOOD PRESSURE: 118 MMHG | WEIGHT: 207 LBS | HEIGHT: 63 IN | BODY MASS INDEX: 36.68 KG/M2

## 2019-04-23 DIAGNOSIS — Z09 POSTOP CHECK: Primary | ICD-10-CM

## 2019-04-23 PROCEDURE — 99024 POSTOP FOLLOW-UP VISIT: CPT | Performed by: SURGERY

## 2019-06-19 ENCOUNTER — OFFICE VISIT (OUTPATIENT)
Dept: RHEUMATOLOGY | Age: 29
End: 2019-06-19
Payer: COMMERCIAL

## 2019-06-19 VITALS
DIASTOLIC BLOOD PRESSURE: 74 MMHG | BODY MASS INDEX: 36.02 KG/M2 | SYSTOLIC BLOOD PRESSURE: 116 MMHG | HEIGHT: 64 IN | WEIGHT: 211 LBS

## 2019-06-19 DIAGNOSIS — M34.9 SYSTEMIC SCLEROSIS (HCC): Primary | ICD-10-CM

## 2019-06-19 PROCEDURE — 99214 OFFICE O/P EST MOD 30 MIN: CPT | Performed by: INTERNAL MEDICINE

## 2019-06-19 RX ORDER — PANTOPRAZOLE SODIUM 40 MG/1
TABLET, DELAYED RELEASE ORAL
Qty: 90 TABLET | Refills: 1 | Status: SHIPPED | OUTPATIENT
Start: 2019-06-19

## 2019-06-19 NOTE — PROGRESS NOTES
[address] [facility], MD                                                           [facility], [address]                                                              (F) [phone] (F)      Dear Dr. Sarah Michelle, APRN - CNP:  Please find Rheumatology assessment. Thank you for giving me the opportunity to be involved in Wendy Giordano's care and I look forward following Wendy along with you. If you have any questions or concerns please feel free to reach me. Note is transcribed using voice recognition software. Inadvertent computerized transcription errors may be present. Patient identification: Angel Reese: 1990,28 y.o. Sex: female     Assessment / Plan:    Wendy was seen today for follow-up. Diagnoses and all orders for this visit:    Systemic sclerosis ([facility] [address]  -     Full PFT Study With Bronchodilator; Future  -     ECHO Complete 2D W Doppler W Color  -     Creatinine, Serum; Future  -     CBC Auto Differential; Future  -     Urinalysis; Future  -     Creatinine, Random Urine; Future  -     Protein, urine, random; Future  -     FRANKIE Reflex to Antibody Cascade; Future  -     C3 Complement; Future  -     C4 Complement; Future        Last seen 6/23/17-systemic sclerosis. New areas of skin thickening-has scleroderma faces. Alopecia-frontal.  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 Mild thickening noted in her forearms. Raynaud's phenomena without any tissue loss, dilated nailfold capillaries and periungual changes, intermittent GERD, positive SCL 70. PFT- reactive airway disease. Echo- normal-6/2017. Plan-  Work-up as above for scleroderma-PFT and echocardiogram.  Given alopecia, obtain lupus labs as well. Therapeutically-add Protonix.   She is aware of protective measures for Raynaud's phenomenon, will plan for calcium channel blocker in the fall/winter. Skin changes are stable in her finger joints, does have facial skin thickening, discussed few medication including methotrexate and CellCept. If she does not have any organ involvement-especially lungs, there is no strong indication to use systemic DMARDs just for skin changes, as it tends to soften down without any intervention. She is made aware of complications mainly scleroderma renal crisis and need for periodic blood pressure monitoring. All her questions were answered, follow-up in 6 months. Patient indicates understanding and agrees with the management plan. I reviewed patient's history, referral documents and electronic medical records. Copy of consult note is being routed electronically/faxed to referring physician. #######################################################################    Subjective: Follow up for Scleroderma-  Raynaud, sclerodactyly causing finger - skin changes and stiffness, + FRANKIE, scl 70+. Last seen couple of years ago, returns for follow-up. Since last seen, she had a baby boy who is 6 months old. New changes- hair thinning/ frontal alopecia, and skin involvement in her face and skin causing scleroderma faces. Other manifestations are stable including Raynaud's phenomenon in her fingers, periungual changes, and has intermittent GERD. She has trouble in making full fist out of right hand because of hand stiffness. She does not have any joint pain or swelling. No rashes, GI problems, blood pressure has been stable. Denies any cough, shortness of breath on exertion, muscle weakness. No intercurrent infections. All other review of systems are negative.     Past Medical History:   Diagnosis Date    Raynaud phenomenon     Scleroderma [facility])      Past Surgical History:   Procedure Laterality Date    CHOLECYSTECTOMY  04/05/2019    CHOLECYSTECTOMY, LAPAROSCOPIC N/A 4/5/2019    CHOLECYSTECTOMY LAPAROSCOPIC performed by Vianey Cruz MD at [facility]- Paternal cousin with Lupus. Current Outpatient Medications   Medication Sig Dispense Refill    Prenatal MV-Min-Fe Fum-FA-DHA (PRENATAL 1 PO) Take by mouth daily       docusate sodium (COLACE) 100 MG capsule Take 100 mg by mouth 2 times daily       No current facility-administered medications for this visit. No Known Allergies    PHYSICAL EXAM:    Vitals:    /74   Ht 5' 4\" (1.626 m)   Wt 211 lb (95.7 kg)   LMP 06/10/2019   BMI 36.22 kg/m²   General appearance/ Psychiatric: well nourished, and well groomed, normal judgement, alert, appears stated age and cooperative. MKS: Right hand-loose fist, left hand full fist.  Sclerodactyly distal to PIP joints bilaterally. Mild skin thickening noted in her forearm skin. Scrotum effaces with narrow mouth opening, and tight skin in her face as well as scalp. Periungual erythema. Dilated nailfold capillaries in all of her fingernail beds. I do not appreciate any tender, swollen or inflamed joints in her hand. Hand symptoms are because of skin findings. Skin: As above,  No evidence ischemia or deformities noted in digits or nails. Normal radial arteries bilaterally. Alopecia mainly frontal and parietal.  HEENT: Normal lids, lacrimal glands and pupils. No oral or nasal ulcers. Salivary glands reveal no evidence of abnormality. External inspection of the ears and nose within normal limits. Neck: No masses or asymmetry. No thyroid enlargement. Chest: Normal effort, clear to auscultation. Heart:  Normal s1/s2, no leg edema.            DATA:   Lab Results   Component Value Date    WBC 11.3 (H) 03/16/2019    HGB 13.5 03/16/2019    HCT 40.6 03/16/2019    MCV 87.7 03/16/2019     03/16/2019         Chemistry        Component Value Date/Time     03/16/2019 1749    K 4.0 03/16/2019 1749     03/16/2019 1749    CO2 27 03/16/2019 1749    BUN 15 03/16/2019 1749    CREATININE 0.7 03/16/2019 1749        Component Value Date/Time    CALCIUM 9.2 03/16/2019 1749    ALKPHOS 113 03/16/2019 1749    AST 62 (H) 03/16/2019 1749    ALT 28 03/16/2019 1749    BILITOT 0.3 03/16/2019 1749        A/P- See above.

## 2019-06-25 ENCOUNTER — TELEPHONE (OUTPATIENT)
Dept: RHEUMATOLOGY | Age: 29
End: 2019-06-25

## 2019-06-25 NOTE — TELEPHONE ENCOUNTER
----- Message from Bruna Martel MD sent at 6/19/2019  1:01 PM EDT -----  Please let her know that I called Protonix for heartburn. Sometimes, heartburn can cause aspiration in scleroderma patients. Please take 1 tablet daily.

## 2019-07-09 ENCOUNTER — HOSPITAL ENCOUNTER (OUTPATIENT)
Age: 29
Discharge: HOME | End: 2019-07-09
Payer: COMMERCIAL

## 2019-07-09 DIAGNOSIS — M34.9 SYSTEMIC SCLEROSIS (HCC): ICD-10-CM

## 2019-07-09 LAB
BILIRUBIN URINE: NEGATIVE
BLOOD, URINE: NEGATIVE
CLARITY: CLEAR
COLOR: YELLOW
GLUCOSE URINE: NEGATIVE MG/DL
KETONES, URINE: NEGATIVE MG/DL
LEUKOCYTE ESTERASE, URINE: NEGATIVE
MICROSCOPIC EXAMINATION: NORMAL
NITRITE, URINE: NEGATIVE
PH UA: 7 (ref 5–8)
PROTEIN UA: NEGATIVE MG/DL
SPECIFIC GRAVITY UA: 1.01 (ref 1–1.03)
URINE TYPE: NORMAL
UROBILINOGEN, URINE: 0.2 E.U./DL

## 2019-07-09 PROCEDURE — 86039 ANTINUCLEAR ANTIBODIES (ANA): CPT

## 2019-07-09 PROCEDURE — 86038 ANTINUCLEAR ANTIBODIES: CPT

## 2019-07-09 PROCEDURE — 82570 ASSAY OF URINE CREATININE: CPT

## 2019-07-09 PROCEDURE — 82565 ASSAY OF CREATININE: CPT

## 2019-07-09 PROCEDURE — 86160 COMPLEMENT ANTIGEN: CPT

## 2019-07-09 PROCEDURE — 36415 COLL VENOUS BLD VENIPUNCTURE: CPT

## 2019-07-09 PROCEDURE — 81003 URINALYSIS AUTO W/O SCOPE: CPT

## 2019-07-09 PROCEDURE — 84156 ASSAY OF PROTEIN URINE: CPT

## 2019-07-09 PROCEDURE — 86235 NUCLEAR ANTIGEN ANTIBODY: CPT

## 2019-07-09 PROCEDURE — 85025 COMPLETE CBC W/AUTO DIFF WBC: CPT

## 2019-07-09 PROCEDURE — 83516 IMMUNOASSAY NONANTIBODY: CPT

## 2019-07-09 PROCEDURE — 86225 DNA ANTIBODY NATIVE: CPT

## 2019-07-10 LAB
ANA INTERPRETATION: ABNORMAL
ANA PATTERN: ABNORMAL
ANA TITER: ABNORMAL
ANTI-CENTROMERE B IGG: <0.2 AI (ref 0–0.9)
ANTI-CHROMATIN IGG: <0.2 AI (ref 0–0.9)
ANTI-DSDNA IGG: 2 IU/ML (ref 0–9)
ANTI-JO1 IGG: <0.2 AI (ref 0–0.9)
ANTI-NUCLEAR ANTIBODY (ANA): POSITIVE
ANTI-RIBOSOMAL P IGG: <0.2 AI (ref 0–0.9)
ANTI-RNP IGG: 0.2 AI (ref 0–0.9)
ANTI-SCL70 IGG: >8 AI (ref 0–0.9)
ANTI-SMITH IGG: <0.2 AI (ref 0–0.9)
ANTI-SMRNP IGG: <0.2 AI (ref 0–0.9)
ANTI-SS-A IGG: <0.2 AI (ref 0–0.9)
ANTI-SS-B IGG: <0.2 AI (ref 0–0.9)
BASOPHILS ABSOLUTE: 0.1 K/UL (ref 0–0.2)
BASOPHILS RELATIVE PERCENT: 0.9 %
C3 COMPLEMENT: 150.5 MG/DL (ref 90–180)
C4 COMPLEMENT: 16.4 MG/DL (ref 10–40)
CREAT SERPL-MCNC: 0.6 MG/DL (ref 0.6–1.1)
CREATININE URINE: 104.2 MG/DL (ref 28–259)
EOSINOPHILS ABSOLUTE: 0.4 K/UL (ref 0–0.6)
EOSINOPHILS RELATIVE PERCENT: 4.1 %
GFR AFRICAN AMERICAN: >60
GFR NON-AFRICAN AMERICAN: >60
HCT VFR BLD CALC: 38.8 % (ref 36–48)
HEMOGLOBIN: 13.2 G/DL (ref 12–16)
LYMPHOCYTES ABSOLUTE: 3.6 K/UL (ref 1–5.1)
LYMPHOCYTES RELATIVE PERCENT: 40.3 %
MCH RBC QN AUTO: 29.8 PG (ref 26–34)
MCHC RBC AUTO-ENTMCNC: 34 G/DL (ref 31–36)
MCV RBC AUTO: 87.6 FL (ref 80–100)
MONOCYTES ABSOLUTE: 0.8 K/UL (ref 0–1.3)
MONOCYTES RELATIVE PERCENT: 8.9 %
NEUTROPHILS ABSOLUTE: 4.1 K/UL (ref 1.7–7.7)
NEUTROPHILS RELATIVE PERCENT: 45.8 %
PATHOLOGIST: ABNORMAL
PDW BLD-RTO: 13.8 % (ref 12.4–15.4)
PLATELET # BLD: 353 K/UL (ref 135–450)
PMV BLD AUTO: 8.4 FL (ref 5–10.5)
PROTEIN PROTEIN: 8 MG/DL
RBC # BLD: 4.43 M/UL (ref 4–5.2)
WBC # BLD: 8.9 K/UL (ref 4–11)

## 2019-09-11 ENCOUNTER — OFFICE VISIT (OUTPATIENT)
Dept: ENT CLINIC | Age: 29
End: 2019-09-11
Payer: COMMERCIAL

## 2019-09-11 VITALS
TEMPERATURE: 98.1 F | DIASTOLIC BLOOD PRESSURE: 75 MMHG | SYSTOLIC BLOOD PRESSURE: 115 MMHG | WEIGHT: 212.2 LBS | HEIGHT: 64 IN | HEART RATE: 97 BPM | BODY MASS INDEX: 36.23 KG/M2

## 2019-09-11 DIAGNOSIS — J03.91 RECURRENT TONSILLITIS: ICD-10-CM

## 2019-09-11 DIAGNOSIS — M34.9 SCLERODERMA (HCC): ICD-10-CM

## 2019-09-11 DIAGNOSIS — J33.9 NASAL POLYP: Primary | ICD-10-CM

## 2019-09-11 DIAGNOSIS — K21.9 GASTROESOPHAGEAL REFLUX DISEASE, ESOPHAGITIS PRESENCE NOT SPECIFIED: ICD-10-CM

## 2019-09-11 PROCEDURE — 31231 NASAL ENDOSCOPY DX: CPT | Performed by: OTOLARYNGOLOGY

## 2019-09-11 PROCEDURE — 99204 OFFICE O/P NEW MOD 45 MIN: CPT | Performed by: OTOLARYNGOLOGY

## 2019-09-11 ASSESSMENT — ENCOUNTER SYMPTOMS
VOICE CHANGE: 0
SHORTNESS OF BREATH: 0
TROUBLE SWALLOWING: 0
APNEA: 0
SINUS PRESSURE: 0
SORE THROAT: 1
COUGH: 0
FACIAL SWELLING: 0
EYE ITCHING: 0

## 2019-11-19 ENCOUNTER — HOSPITAL ENCOUNTER (OUTPATIENT)
Dept: CT IMAGING | Age: 29
Discharge: HOME | End: 2019-11-19
Payer: COMMERCIAL

## 2019-11-19 DIAGNOSIS — J33.9 NASAL POLYP: ICD-10-CM

## 2019-11-19 PROCEDURE — 70486 CT MAXILLOFACIAL W/O DYE: CPT

## 2019-11-26 ENCOUNTER — OFFICE VISIT (OUTPATIENT)
Dept: ENT CLINIC | Age: 29
End: 2019-11-26
Payer: COMMERCIAL

## 2019-11-26 VITALS
DIASTOLIC BLOOD PRESSURE: 70 MMHG | WEIGHT: 217 LBS | BODY MASS INDEX: 37.05 KG/M2 | HEIGHT: 64 IN | SYSTOLIC BLOOD PRESSURE: 109 MMHG | HEART RATE: 84 BPM

## 2019-11-26 DIAGNOSIS — M34.9 SCLERODERMA (HCC): ICD-10-CM

## 2019-11-26 DIAGNOSIS — J03.91 RECURRENT TONSILLITIS: ICD-10-CM

## 2019-11-26 DIAGNOSIS — J33.9 NASAL POLYP: Primary | ICD-10-CM

## 2019-11-26 PROCEDURE — 99213 OFFICE O/P EST LOW 20 MIN: CPT | Performed by: OTOLARYNGOLOGY

## 2019-11-27 ASSESSMENT — ENCOUNTER SYMPTOMS
VOICE CHANGE: 0
SINUS PRESSURE: 0
SHORTNESS OF BREATH: 0
TROUBLE SWALLOWING: 0
APNEA: 0
FACIAL SWELLING: 0
EYE ITCHING: 0
COUGH: 0
SORE THROAT: 0

## 2020-05-14 LAB
C. TRACHOMATIS, EXTERNAL RESULT: NEGATIVE
N. GONORRHOEAE, EXTERNAL RESULT: NEGATIVE

## 2020-05-27 LAB
ABO, EXTERNAL RESULT: NORMAL
HEP B, EXTERNAL RESULT: NEGATIVE
HIV, EXTERNAL RESULT: NEGATIVE
RH FACTOR, EXTERNAL RESULT: POSITIVE
RPR, EXTERNAL RESULT: NONREACTIVE
RUBELLA TITER, EXTERNAL RESULT: NORMAL

## 2020-12-03 LAB — GBS, EXTERNAL RESULT: NEGATIVE

## 2020-12-08 ENCOUNTER — OFFICE VISIT (OUTPATIENT)
Dept: PRIMARY CARE CLINIC | Age: 30
End: 2020-12-08
Payer: COMMERCIAL

## 2020-12-08 PROCEDURE — 99211 OFF/OP EST MAY X REQ PHY/QHP: CPT | Performed by: NURSE PRACTITIONER

## 2020-12-08 NOTE — PATIENT INSTRUCTIONS

## 2020-12-08 NOTE — PROGRESS NOTES
Wendy SD Giordano received a viral test for COVID-19. They were educated on isolation and quarantine as appropriate. For any symptoms, they were directed to seek care from their PCP, given contact information to establish with a doctor, directed to an urgent care or the emergency room.

## 2020-12-09 LAB — SARS-COV-2: NOT DETECTED

## 2020-12-17 ENCOUNTER — HOSPITAL ENCOUNTER (INPATIENT)
Age: 30
LOS: 2 days | Discharge: HOME | End: 2020-12-19
Attending: OBSTETRICS & GYNECOLOGY | Admitting: OBSTETRICS & GYNECOLOGY
Payer: COMMERCIAL

## 2020-12-17 ENCOUNTER — ANESTHESIA EVENT (OUTPATIENT)
Dept: LABOR AND DELIVERY | Age: 30
End: 2020-12-17
Payer: COMMERCIAL

## 2020-12-17 ENCOUNTER — ANESTHESIA (OUTPATIENT)
Dept: LABOR AND DELIVERY | Age: 30
End: 2020-12-17
Payer: COMMERCIAL

## 2020-12-17 PROBLEM — O42.90 AMNIOTIC FLUID LEAKING: Status: ACTIVE | Noted: 2020-12-17

## 2020-12-17 LAB
ABO/RH: NORMAL
AMPHETAMINE SCREEN, URINE: NORMAL
ANTIBODY SCREEN: NORMAL
BARBITURATE SCREEN URINE: NORMAL
BASOPHILS ABSOLUTE: 0.1 K/UL (ref 0–0.2)
BASOPHILS RELATIVE PERCENT: 0.6 %
BENZODIAZEPINE SCREEN, URINE: NORMAL
BUPRENORPHINE URINE: NORMAL
CANNABINOID SCREEN URINE: NORMAL
COCAINE METABOLITE SCREEN URINE: NORMAL
EOSINOPHILS ABSOLUTE: 0.1 K/UL (ref 0–0.6)
EOSINOPHILS RELATIVE PERCENT: 0.7 %
HCT VFR BLD CALC: 41.2 % (ref 36–48)
HEMOGLOBIN: 13.8 G/DL (ref 12–16)
LYMPHOCYTES ABSOLUTE: 2.5 K/UL (ref 1–5.1)
LYMPHOCYTES RELATIVE PERCENT: 28.3 %
Lab: NORMAL
MCH RBC QN AUTO: 29.5 PG (ref 26–34)
MCHC RBC AUTO-ENTMCNC: 33.4 G/DL (ref 31–36)
MCV RBC AUTO: 88.3 FL (ref 80–100)
METHADONE SCREEN, URINE: NORMAL
MONOCYTES ABSOLUTE: 0.5 K/UL (ref 0–1.3)
MONOCYTES RELATIVE PERCENT: 5.7 %
NEUTROPHILS ABSOLUTE: 5.8 K/UL (ref 1.7–7.7)
NEUTROPHILS RELATIVE PERCENT: 64.7 %
OPIATE SCREEN URINE: NORMAL
OXYCODONE URINE: NORMAL
PDW BLD-RTO: 13.4 % (ref 12.4–15.4)
PH UA: 6
PHENCYCLIDINE SCREEN URINE: NORMAL
PLATELET # BLD: 245 K/UL (ref 135–450)
PMV BLD AUTO: 9.3 FL (ref 5–10.5)
PROPOXYPHENE SCREEN: NORMAL
RBC # BLD: 4.67 M/UL (ref 4–5.2)
WBC # BLD: 8.9 K/UL (ref 4–11)

## 2020-12-17 PROCEDURE — 6370000000 HC RX 637 (ALT 250 FOR IP): Performed by: ADVANCED PRACTICE MIDWIFE

## 2020-12-17 PROCEDURE — 86901 BLOOD TYPING SEROLOGIC RH(D): CPT

## 2020-12-17 PROCEDURE — 51701 INSERT BLADDER CATHETER: CPT

## 2020-12-17 PROCEDURE — 86850 RBC ANTIBODY SCREEN: CPT

## 2020-12-17 PROCEDURE — 85025 COMPLETE CBC W/AUTO DIFF WBC: CPT

## 2020-12-17 PROCEDURE — 80307 DRUG TEST PRSMV CHEM ANLYZR: CPT

## 2020-12-17 PROCEDURE — 6360000002 HC RX W HCPCS: Performed by: ADVANCED PRACTICE MIDWIFE

## 2020-12-17 PROCEDURE — 86780 TREPONEMA PALLIDUM: CPT

## 2020-12-17 PROCEDURE — 0KQM0ZZ REPAIR PERINEUM MUSCLE, OPEN APPROACH: ICD-10-PCS | Performed by: OBSTETRICS & GYNECOLOGY

## 2020-12-17 PROCEDURE — 1220000000 HC SEMI PRIVATE OB R&B

## 2020-12-17 PROCEDURE — 7200000001 HC VAGINAL DELIVERY

## 2020-12-17 PROCEDURE — 86900 BLOOD TYPING SEROLOGIC ABO: CPT

## 2020-12-17 PROCEDURE — 2580000003 HC RX 258: Performed by: ADVANCED PRACTICE MIDWIFE

## 2020-12-17 PROCEDURE — 2500000003 HC RX 250 WO HCPCS: Performed by: NURSE ANESTHETIST, CERTIFIED REGISTERED

## 2020-12-17 PROCEDURE — 3700000025 EPIDURAL BLOCK: Performed by: ANESTHESIOLOGY

## 2020-12-17 RX ORDER — LANOLIN 100 %
OINTMENT (GRAM) TOPICAL PRN
Status: DISCONTINUED | OUTPATIENT
Start: 2020-12-17 | End: 2020-12-19 | Stop reason: HOSPADM

## 2020-12-17 RX ORDER — FERROUS SULFATE 325(65) MG
325 TABLET ORAL 2 TIMES DAILY WITH MEALS
Status: DISCONTINUED | OUTPATIENT
Start: 2020-12-18 | End: 2020-12-19 | Stop reason: HOSPADM

## 2020-12-17 RX ORDER — SODIUM CHLORIDE 0.9 % (FLUSH) 0.9 %
10 SYRINGE (ML) INJECTION EVERY 12 HOURS SCHEDULED
Status: DISCONTINUED | OUTPATIENT
Start: 2020-12-17 | End: 2020-12-18

## 2020-12-17 RX ORDER — SODIUM CHLORIDE, SODIUM LACTATE, POTASSIUM CHLORIDE, CALCIUM CHLORIDE 600; 310; 30; 20 MG/100ML; MG/100ML; MG/100ML; MG/100ML
INJECTION, SOLUTION INTRAVENOUS CONTINUOUS
Status: DISCONTINUED | OUTPATIENT
Start: 2020-12-17 | End: 2020-12-19 | Stop reason: HOSPADM

## 2020-12-17 RX ORDER — BUPIVACAINE HYDROCHLORIDE 2.5 MG/ML
INJECTION, SOLUTION EPIDURAL; INFILTRATION; INTRACAUDAL
Status: COMPLETED
Start: 2020-12-17 | End: 2020-12-17

## 2020-12-17 RX ORDER — SODIUM CHLORIDE 0.9 % (FLUSH) 0.9 %
10 SYRINGE (ML) INJECTION PRN
Status: DISCONTINUED | OUTPATIENT
Start: 2020-12-17 | End: 2020-12-19 | Stop reason: HOSPADM

## 2020-12-17 RX ORDER — DOCUSATE SODIUM 100 MG/1
100 CAPSULE, LIQUID FILLED ORAL 2 TIMES DAILY
Status: DISCONTINUED | OUTPATIENT
Start: 2020-12-17 | End: 2020-12-19 | Stop reason: HOSPADM

## 2020-12-17 RX ORDER — IBUPROFEN 800 MG/1
800 TABLET ORAL EVERY 6 HOURS PRN
Status: DISCONTINUED | OUTPATIENT
Start: 2020-12-17 | End: 2020-12-19 | Stop reason: HOSPADM

## 2020-12-17 RX ORDER — SODIUM CHLORIDE, SODIUM LACTATE, POTASSIUM CHLORIDE, CALCIUM CHLORIDE 600; 310; 30; 20 MG/100ML; MG/100ML; MG/100ML; MG/100ML
INJECTION, SOLUTION INTRAVENOUS CONTINUOUS
Status: DISCONTINUED | OUTPATIENT
Start: 2020-12-17 | End: 2020-12-17

## 2020-12-17 RX ORDER — ACETAMINOPHEN 325 MG/1
650 TABLET ORAL EVERY 4 HOURS PRN
Status: DISCONTINUED | OUTPATIENT
Start: 2020-12-17 | End: 2020-12-19 | Stop reason: HOSPADM

## 2020-12-17 RX ORDER — BUPIVACAINE HYDROCHLORIDE 2.5 MG/ML
INJECTION, SOLUTION EPIDURAL; INFILTRATION; INTRACAUDAL PRN
Status: DISCONTINUED | OUTPATIENT
Start: 2020-12-17 | End: 2020-12-17 | Stop reason: SDUPTHER

## 2020-12-17 RX ORDER — SODIUM CHLORIDE 0.9 % (FLUSH) 0.9 %
10 SYRINGE (ML) INJECTION PRN
Status: DISCONTINUED | OUTPATIENT
Start: 2020-12-17 | End: 2020-12-17

## 2020-12-17 RX ORDER — ONDANSETRON 2 MG/ML
4 INJECTION INTRAMUSCULAR; INTRAVENOUS EVERY 6 HOURS PRN
Status: DISCONTINUED | OUTPATIENT
Start: 2020-12-17 | End: 2020-12-17

## 2020-12-17 RX ORDER — SODIUM CHLORIDE 0.9 % (FLUSH) 0.9 %
10 SYRINGE (ML) INJECTION EVERY 12 HOURS SCHEDULED
Status: DISCONTINUED | OUTPATIENT
Start: 2020-12-17 | End: 2020-12-19 | Stop reason: HOSPADM

## 2020-12-17 RX ORDER — BUPIVACAINE HYDROCHLORIDE 5 MG/ML
INJECTION, SOLUTION EPIDURAL; INTRACAUDAL PRN
Status: DISCONTINUED | OUTPATIENT
Start: 2020-12-17 | End: 2020-12-17 | Stop reason: SDUPTHER

## 2020-12-17 RX ORDER — LORATADINE 10 MG/1
10 TABLET ORAL DAILY
COMMUNITY

## 2020-12-17 RX ORDER — BUPIVACAINE HYDROCHLORIDE 5 MG/ML
INJECTION, SOLUTION EPIDURAL; INTRACAUDAL
Status: COMPLETED
Start: 2020-12-17 | End: 2020-12-17

## 2020-12-17 RX ADMIN — Medication 166.7 ML: at 21:17

## 2020-12-17 RX ADMIN — SODIUM CHLORIDE, POTASSIUM CHLORIDE, SODIUM LACTATE AND CALCIUM CHLORIDE: 600; 310; 30; 20 INJECTION, SOLUTION INTRAVENOUS at 15:10

## 2020-12-17 RX ADMIN — BUPIVACAINE HYDROCHLORIDE 3 ML: 5 INJECTION, SOLUTION EPIDURAL; INTRACAUDAL; PERINEURAL at 16:20

## 2020-12-17 RX ADMIN — WITCH HAZEL 40 EACH: 500 SOLUTION RECTAL; TOPICAL at 23:51

## 2020-12-17 RX ADMIN — Medication 87.3 MILLI-UNITS/MIN: at 21:30

## 2020-12-17 RX ADMIN — SODIUM CHLORIDE, POTASSIUM CHLORIDE, SODIUM LACTATE AND CALCIUM CHLORIDE: 600; 310; 30; 20 INJECTION, SOLUTION INTRAVENOUS at 16:30

## 2020-12-17 RX ADMIN — BUPIVACAINE HYDROCHLORIDE 3 ML: 2.5 INJECTION, SOLUTION EPIDURAL; INFILTRATION; INTRACAUDAL; PERINEURAL at 16:20

## 2020-12-17 RX ADMIN — BENZOCAINE AND LEVOMENTHOL: 200; 5 SPRAY TOPICAL at 23:51

## 2020-12-17 RX ADMIN — Medication 15 ML/HR: at 16:25

## 2020-12-17 RX ADMIN — Medication 1 MILLI-UNITS/MIN: at 17:50

## 2020-12-17 RX ADMIN — SODIUM CHLORIDE, POTASSIUM CHLORIDE, SODIUM LACTATE AND CALCIUM CHLORIDE: 600; 310; 30; 20 INJECTION, SOLUTION INTRAVENOUS at 19:38

## 2020-12-17 ASSESSMENT — PAIN - FUNCTIONAL ASSESSMENT
PAIN_FUNCTIONAL_ASSESSMENT: ACTIVITIES ARE NOT PREVENTED
PAIN_FUNCTIONAL_ASSESSMENT: 0-10

## 2020-12-17 ASSESSMENT — PAIN DESCRIPTION - DESCRIPTORS
DESCRIPTORS: CRAMPING

## 2020-12-17 NOTE — H&P
Department of Obstetrics and Gynecology   Obstetrics History and Physical        CHIEF COMPLAINT:  Admitted from office w/ SROM and onset of labor. Plans epidural. Denies coughing/SOB/SOB and known covid exposures. Covid negative 2020, Second test this week is still pending    HISTORY OF PRESENT ILLNESS:      The patient is a 31 y.o. female at 38w3d. OB History        2    Para   1    Term   1       0    AB   0    Living   1       SAB   0    TAB   0    Ectopic   0    Molar   0    Multiple   0    Live Births   1            Patient presents with a chief complaint as above and is being admitted for active phase labor    Estimated Due Date: Estimated Date of Delivery: 20    PRENATAL CARE:    Complicated by: none    PAST OB HISTORY:  OB History        2    Para   1    Term   1       0    AB   0    Living   1       SAB   0    TAB   0    Ectopic   0    Molar   0    Multiple   0    Live Births   1                Past Medical History:        Diagnosis Date    Gastroesophageal reflux disease 2019    Raynaud phenomenon     [address] ([facility] [address]      Past Surgical History:        Procedure Laterality Date    CHOLECYSTECTOMY  2019    CHOLECYSTECTOMY, LAPAROSCOPIC N/A 2019    CHOLECYSTECTOMY LAPAROSCOPIC performed by Annabel Babb MD at [address] EXTRACTION       Allergies:  Patient has no known allergies.     Social History:    Social History     Socioeconomic History    Marital status:      Spouse name: Not on file    Number of children: Not on file    Years of education: Not on file    Highest education level: Not on file   Occupational History    Not on file   Social Needs    Financial resource strain: Not on file    Food insecurity     Worry: Not on file     Inability: Not on file    Transportation needs     Medical: Not on file     Non-medical: Not on file   Tobacco Use    Smoking status: Never Smoker  Smokeless tobacco: Never Used   Substance and Sexual Activity    Alcohol use: No    Drug use: No    Sexual activity: Yes     Partners: Male   Lifestyle    Physical activity     Days per week: Not on file     Minutes per session: Not on file    Stress: Not on file   Relationships    Social connections     Talks on phone: Not on file     Gets together: Not on file     Attends [employer] service: Not on file     Active member of club or organization: Not on file     Attends meetings of clubs or organizations: Not on file     Relationship status: Not on file    Intimate partner violence     Fear of current or ex partner: Not on file     Emotionally abused: Not on file     Physically abused: Not on file     Forced sexual activity: Not on file   Other Topics Concern    Not on file   Social History Narrative    Not on file     Family History:       Problem Relation Age of Onset    High Blood Pressure Mother     No Known Problems Father     Cancer Paternal Grandfather         lung ca- smoker    Cancer Maternal Cousin         breast ca     Medications Prior to Admission:  Medications Prior to Admission: loratadine (CLARITIN) 10 MG tablet, Take 10 mg by mouth daily  Prenatal MV-Min-Fe Fum-FA-DHA (PRENATAL 1 PO), Take by mouth daily   docusate sodium (COLACE) 100 MG capsule, Take 100 mg by mouth 2 times daily  pantoprazole (PROTONIX) 40 MG tablet, Take 1 tab po daily. REVIEW OF SYSTEMS:    As per HPI    PHYSICAL EXAM:  Vitals:    12/17/20 1509 12/17/20 1548 12/17/20 1600   BP: (!) 142/88  137/88   Pulse: 102  78   Resp: 18  18   Temp: 98.1 °F (36.7 °C)     TempSrc: Oral     Weight:  244 lb (110.7 kg)    Height:  5' 4\" (1.626 m)      General appearance:  awake, alert, cooperative, no apparent distress, and appears stated age  Neurologic:  Awake, alert, oriented to name, place and time.     Lungs:  No increased work of breathing, good air exchange Abdomen:  Soft, non tender, gravid, consistent with her gestational age, EFW by Leopold's maneuver was 8#   Fetal heart rate:  Reassuring 155 moderate variability.  Accels present  Pelvis:  Adequate pelvis  Cervix: 5-6/80/-1 in office prior to admit  Contraction frequency:  4-5 minutes    Membranes:  Ruptured clear fluid    Labs:   CBC:   Lab Results   Component Value Date    WBC 8.9 2020    RBC 4.67 2020    HGB 13.8 2020    HCT 41.2 2020    MCV 88.3 2020    MCH 29.5 2020    MCHC 33.4 2020    RDW 13.4 2020     2020    MPV 9.3 2020     Urine Toxicology:  No components found for: IAMMENTA, IBARBIT, IBENZO, ICOCAINE, IMARTHC, IOPIATES, IPHENCYC    ASSESSMENT AND PLAN:  33yo  at 39.3 wks in active labor at term  FHT Category 1  GBS Negative  Admit, routine labs, epidural, Dr Ceferino Tran notified of admit  Anticipate vaginal delivery

## 2020-12-17 NOTE — ANESTHESIA PRE PROCEDURE
Department of Anesthesiology  Preprocedure Note       Name:  Darrius Lake   Age:  31 y.o.  :  1990                                          MRN:  [id number]         Date:  2020      Surgeon: * No surgeons listed *    Procedure: * No procedures listed *    Medications prior to admission:   Prior to Admission medications    Medication Sig Start Date End Date Taking? Authorizing Provider   loratadine (CLARITIN) 10 MG tablet Take 10 mg by mouth daily   Yes Historical Provider, MD   Prenatal MV-Min-Fe Fum-FA-DHA (PRENATAL 1 PO) Take by mouth daily    Yes Historical Provider, MD   docusate sodium (COLACE) 100 MG capsule Take 100 mg by mouth 2 times daily   Yes Historical Provider, MD   pantoprazole (PROTONIX) 40 MG tablet Take 1 tab po daily.  19   King Mark MD       Current medications:    Current Facility-Administered Medications   Medication Dose Route Frequency Provider Last Rate Last Admin    lactated ringers infusion   Intravenous Continuous Ashley Reyna, APRN -  mL/hr at 20 1510 New Bag at 20 1510    lactated ringers infusion   Intravenous Continuous Ashley Millerna, APRN - CNM        sodium chloride flush 0.9 % injection 10 mL  10 mL Intravenous 2 times per day Ashley Paulna, APRN - CNM        sodium chloride flush 0.9 % injection 10 mL  10 mL Intravenous PRN Ashley Reyna, APRN - CNM        oxytocin (PITOCIN) 10 unit bolus from the bag  10 Units Intravenous PRN Ashley Millerna, APRN - CNM        And    oxytocin (PITOCIN) 30 units in 500 mL infusion  87.3 emely-units/min Intravenous PRN Ashley Reyna, APRN - CNM        ondansetron [facility] injection 4 mg  4 mg Intravenous Q6H PRN Ashley Sarna, APRN - CNM         Facility-Administered Medications Ordered in Other Encounters   Medication Dose Route Frequency Provider Last Rate Last Admin    bupivacaine (PF) (MARCAINE) 0.25 % injection    PRN Lori Ledbetter APRN - CRNA   3 mL at 20 1620 Body mass index is 41.88 kg/m². CBC:   Lab Results   Component Value Date    WBC 8.9 12/17/2020    RBC 4.67 12/17/2020    HGB 13.8 12/17/2020    HCT 41.2 12/17/2020    MCV 88.3 12/17/2020    RDW 13.4 12/17/2020     12/17/2020       CMP:   Lab Results   Component Value Date     03/16/2019    K 4.0 03/16/2019     03/16/2019    CO2 27 03/16/2019    BUN 15 03/16/2019    CREATININE 0.6 07/09/2019    GFRAA >60 07/09/2019    AGRATIO 1.1 03/16/2019    LABGLOM >60 07/09/2019    GLUCOSE 109 03/16/2019    PROT 7.5 03/16/2019    CALCIUM 9.2 03/16/2019    BILITOT 0.3 03/16/2019    ALKPHOS 113 03/16/2019    AST 62 03/16/2019    ALT 28 03/16/2019       POC Tests: No results for input(s): POCGLU, POCNA, POCK, POCCL, POCBUN, POCHEMO, POCHCT in the last 72 hours. Coags: No results found for: PROTIME, INR, APTT    HCG (If Applicable):   Lab Results   Component Value Date    PREGTESTUR Negative 04/05/2019        ABGs: No results found for: PHART, PO2ART, UVO6SAV, NJQ3RVG, BEART, O7ICIZIX     Type & Screen (If Applicable):  No results found for: LABABO, LABRH    Drug/Infectious Status (If Applicable):  No results found for: HIV, HEPCAB    COVID-19 Screening (If Applicable):   Lab Results   Component Value Date    COVID19 Not Detected 12/08/2020         Anesthesia Evaluation  Patient summary reviewed and Nursing notes reviewed  Airway: Mallampati: II  TM distance: >3 FB   Neck ROM: full  Mouth opening: > = 3 FB Dental:          Pulmonary:Negative Pulmonary ROS                              Cardiovascular:Negative CV ROS                      Neuro/Psych:   Negative Neuro/Psych ROS              GI/Hepatic/Renal:   (+) GERD:, morbid obesity          Endo/Other:    (+) : arthritis:., .                 Abdominal:   (+) obese,         Vascular:           ROS comment: raynauds dz. .                             Anesthesia Plan      epidural     ASA 3 - emergent

## 2020-12-17 NOTE — PROGRESS NOTES
Comfortable with epidural.  Afebrile, VSS  FHT Category 1 155 moderate variability.  Accels present  Contractions q 2-4 minutes  Cx 6/90/-1 AROM of forebag for large amount of clear fluid return  Continue plan of care  Anticipate vaginal delivery

## 2020-12-18 LAB
BASOPHILS ABSOLUTE: 0 K/UL (ref 0–0.2)
BASOPHILS RELATIVE PERCENT: 0.3 %
EOSINOPHILS ABSOLUTE: 0.1 K/UL (ref 0–0.6)
EOSINOPHILS RELATIVE PERCENT: 0.6 %
HCT VFR BLD CALC: 31.4 % (ref 36–48)
HEMOGLOBIN: 10.5 G/DL (ref 12–16)
LYMPHOCYTES ABSOLUTE: 3.3 K/UL (ref 1–5.1)
LYMPHOCYTES RELATIVE PERCENT: 26.6 %
MCH RBC QN AUTO: 30.2 PG (ref 26–34)
MCHC RBC AUTO-ENTMCNC: 33.3 G/DL (ref 31–36)
MCV RBC AUTO: 90.7 FL (ref 80–100)
MONOCYTES ABSOLUTE: 0.8 K/UL (ref 0–1.3)
MONOCYTES RELATIVE PERCENT: 6.1 %
NEUTROPHILS ABSOLUTE: 8.3 K/UL (ref 1.7–7.7)
NEUTROPHILS RELATIVE PERCENT: 66.4 %
PDW BLD-RTO: 13.4 % (ref 12.4–15.4)
PLATELET # BLD: 197 K/UL (ref 135–450)
PMV BLD AUTO: 8.8 FL (ref 5–10.5)
RBC # BLD: 3.46 M/UL (ref 4–5.2)
TOTAL SYPHILLIS IGG/IGM: NORMAL
WBC # BLD: 12.6 K/UL (ref 4–11)

## 2020-12-18 PROCEDURE — 6360000002 HC RX W HCPCS: Performed by: ADVANCED PRACTICE MIDWIFE

## 2020-12-18 PROCEDURE — 1220000000 HC SEMI PRIVATE OB R&B

## 2020-12-18 PROCEDURE — 36415 COLL VENOUS BLD VENIPUNCTURE: CPT

## 2020-12-18 PROCEDURE — 2580000003 HC RX 258: Performed by: ADVANCED PRACTICE MIDWIFE

## 2020-12-18 PROCEDURE — 85025 COMPLETE CBC W/AUTO DIFF WBC: CPT

## 2020-12-18 PROCEDURE — 6370000000 HC RX 637 (ALT 250 FOR IP): Performed by: ADVANCED PRACTICE MIDWIFE

## 2020-12-18 RX ADMIN — IBUPROFEN 800 MG: 800 TABLET, FILM COATED ORAL at 10:45

## 2020-12-18 RX ADMIN — ACETAMINOPHEN 650 MG: 325 TABLET ORAL at 20:22

## 2020-12-18 RX ADMIN — Medication 10 ML: at 20:23

## 2020-12-18 RX ADMIN — IBUPROFEN 800 MG: 800 TABLET, FILM COATED ORAL at 03:39

## 2020-12-18 RX ADMIN — IBUPROFEN 800 MG: 800 TABLET, FILM COATED ORAL at 17:35

## 2020-12-18 RX ADMIN — Medication 1 MILLI-UNITS/MIN: at 00:17

## 2020-12-18 RX ADMIN — DOCUSATE SODIUM 100 MG: 100 CAPSULE ORAL at 10:45

## 2020-12-18 RX ADMIN — DOCUSATE SODIUM 100 MG: 100 CAPSULE ORAL at 20:23

## 2020-12-18 RX ADMIN — ACETAMINOPHEN 650 MG: 325 TABLET ORAL at 15:01

## 2020-12-18 ASSESSMENT — PAIN SCALES - GENERAL
PAINLEVEL_OUTOF10: 1
PAINLEVEL_OUTOF10: 2
PAINLEVEL_OUTOF10: 4
PAINLEVEL_OUTOF10: 1
PAINLEVEL_OUTOF10: 3

## 2020-12-18 NOTE — PROGRESS NOTES
Updated ALLYSSA Arrington, WEROM of total QBL after recovery period is 650ml, pt also became lightheaded on her way back from the bathroom with her first-time-getup. Will hang a second bag of pitocin at 125ml/hr at this time.

## 2020-12-18 NOTE — PROGRESS NOTES
Feeling some pressure with contractions  Afebrile,VSS   moderate variability.  Occasional variables  Contractions q2-3 minutes Pitocin 4miu  Cx AC/100/+1  Anticipate delivery soon

## 2020-12-18 NOTE — PROGRESS NOTES
Patient up for the first time with the help of 2 RNs. Assist x 2 provided; patient tolerated ambulation to bathroom well. Pericare performed, panties applied and pad changed. Patient voided into toilet (missed hat) without difficulty. Patient became lightheaded on the way back from the bathroom and was assisted to sit on bedside by RNs, after a moment pt felt better and is now pink and stable.

## 2020-12-18 NOTE — ANESTHESIA POSTPROCEDURE EVALUATION
Department of Anesthesiology  Postprocedure Note    Patient: Jorge Alberto Weston  MRN: [id number]  YOB: 1990  Date of evaluation: 12/18/2020  Time:  10:19 AM     Procedure Summary     Date: 12/17/20 Room / Location:     Anesthesia Start: 1600 Anesthesia Stop: 2110    Procedure: Labor Analgesia Diagnosis:     Scheduled Providers:  Responsible Provider: Padilla Gagnon MD    Anesthesia Type: epidural ASA Status: 3 - Emergent          Anesthesia Type: No value filed. Chandu Phase I:      Chandu Phase II: Chandu Score: 10    Last vitals: Reviewed and per EMR flowsheets. Anesthesia Post Evaluation    Patient location during evaluation: bedside  Patient participation: complete - patient participated  Level of consciousness: awake and alert  Pain score: 1  Airway patency: patent  Nausea & Vomiting: no vomiting and no nausea  Complications: no  Cardiovascular status: hemodynamically stable  Respiratory status: acceptable and room air  Hydration status: stable  Comments: S/P vaginal delivery with epidural analgesia 12-. No reported or apparent anesthesia complications. VSS.     Height: 5' 4\" (1.626 m), Weight: 244 lb (110.7 kg), BP: 131/80

## 2020-12-18 NOTE — LACTATION NOTE
Lactation Progress Note      Data:   Multip and experienced breast feeding who delivered last evening. Mob reports that baby is feeding well. BF first baby x 17 months. Action: Breast feeding education provided. Encouraged to allow baby to go to breast ad acosta and stressed importance of a good deep latch. Offered latch assessment prn. Discouraged paci and bottles for the first few weeks. Encouraged good hydration and nutrition. [facility] number on board for f/u. Response: Verbalized understanding and comfortable with breast feeding at this time.

## 2020-12-18 NOTE — PROGRESS NOTES
Patient actively pushing. RN and CNM remains in continuous attendance at the bedside. Assessment & evaluation of fetal heart rate ongoing via continuous EFM.

## 2020-12-18 NOTE — PROGRESS NOTES
Pt. Passed a clot a little smaller than the size of a lemon in the shower. It weighed 40 g. Pt. Had a second clot half the size in the shower as well, that was unable to be measured.

## 2020-12-18 NOTE — L&D DELIVERY NOTE
Department of Obstetrics and Gynecology  Spontaneous Vaginal Delivery Note         Pre-operative Diagnosis:  Term pregnancy, Spontaneous labor and Uncomplicated pregnancy    Post-operative Diagnosis:  Living  infant(s) and Female    Procedure:  Spontaneous vaginal delivery or Repair second degree spontaneous laceration    Surgeon:   Josy Simmons CNM    Information for the patient's :  Miguel Nguyen [[id number]]          Anesthesia:  epidural anesthesia    Estimated blood loss:  300ml    Specimen:  Placenta not sent to pathology     Cord blood sent No    Complications:  none    Condition:  infant stable and skin to skin with the mother and mother stable    Details of Procedure: The patient is a 31 y.o. female at 38w3d   OB History        2    Para   1    Term   1       0    AB   0    Living   1       SAB   0    TAB   0    Ectopic   0    Molar   0    Multiple   0    Live Births   1             who was admitted for active phase labor. She received the following interventions: ARBOW of forebag and IV Pitocin augmentation. Max pitocin was 4 miu. She was known to be GBS negative and did not receive antibiotic prophylaxis. The patient progressed well,did receive an epidural, became complete and started to push. After pushing for 45 minutes the fetal head was at the perineum, there was a loose nuchal cord that was reduced over the fetal head and the rest of the infant delivered atraumatically and was placed on the mother's abdomen. The infant established good color, tone and cry with stimulation. Cord was clamped and cut after delay and infant was placed skin to skin with the mother. The delivery of the placenta was spontaneous. The perineum and vagina were explored and a second degree laceration was repaired in standard fashion. This mother plans to breast feed. Dr Mary Carmen Kyle will be notified of this birth.

## 2020-12-18 NOTE — LACTATION NOTE
This note was copied from a baby's chart. Lactation Progress Note      Data:     F/u during lactation rounds with multip experienced breast feeding mother baby couplet, 1 pp. Pt reports baby is latching and breast feeding well. Denies any questions or concerns at this time. Pt breast fed her first child x 17 months, now 37 years old. Action: Introduced self as [facility] on for this evening and offered much support. Breast feeding education reviewed including breast care, when to expect mature milk supply, expected  feeding behaviors during the first 24-48 hours of life, and how to know infant is getting enough at the breast including appropriate feedings, output, and weight trends. Encouraged much STS, offering the breast exclusively, when infant is first begining to wake and show hunger cues, and every 2-3 hours if baby is sleepy and without cues. Gave tips to wake sleepy baby as needed. Encouraged much STS and hand expression of colostrum when offering the breast. Instructed that baby should have a minimum of 8-12 good feedings in a 24 hour period after the first DOL. Reviewed how a good latch should look and feel, and encouraged to call for LC to assess latch as needed. Instructed how to break latch if ever shallow, pinching or painful and instructed that nipple should be rounded with release from the breast. Reviewed risks related to pacifiers, artificial nipples, and formula supplements when given without medical indication. Encouraged exclusive breast feeding unless medical indication were to arise. Name and number provided on whiteboard. Encouraged to call for [facility] to assess latch and for f/u support prn. Response: Verbalized understanding of teaching provided. Comfortable with breast feeding at this time. Will call for f/u support prn.

## 2020-12-18 NOTE — L&D DELIVERY SUMMARY NOTE
[address] [address]                            LABOR AND DELIVERY NOTE    PATIENT NAME: Gage Meneses                   :        1990  MED REC NO:   [id number]                          ROOM:       3730  ACCOUNT NO:   [de-identified]                           ADMIT DATE: 2020  PROVIDER:     Shannon Barajas CNM    DATE OF PROCEDURE:  2020    PREOPERATIVE DIAGNOSIS:  Text. The patient is a 46-year-old  2, para 1 at 44 weeks and 3 days  gestation who was admitted on 2020 in early active labor at term  with spontaneous rupture of membranes that occurred upon exam in the  office today. On admission, she was 5 to 6 cm dilated, 80% effaced -1  station with a cephalic presentation. Fetal heart tones were category  I.  GBS status was negative. Her pregnancy course was uncomplicated. She did request and received an epidural with good relief. She was  examined after the epidural and found to be a 6 to 7 cm. Artificial  rupture of the forebag was done at that time for a large amount of clear  fluid return. Contractions did space in frequency after the epidural  and Pitocin augmentation was started and max Pitocin was 4 milliunits. She was known GBS negative and did not receive antibiotics. She  progressed very well to complete. She pushed for approximately 45  minutes to  and had a spontaneous vaginal delivery of a liveborn  infant female at 21:10 on 2020. There was a loose nuchal cord  which was reduced over the fetal head prior to delivery of the  shoulders. The infant established good color, tone, and cry with  stimulation. Cord was doubly clamped and cut after delay and the infant  was then placed skin-to-skin with the mother. The placenta delivered  spontaneously and was inspected and found to be intact.   The uterus contracted promptly and the patient did receive oxytocin and the IV  fluids. EBL was 300 mL. Upon inspection of the perineum and vagina,  she was found to have a second-degree laceration, this was repaired with  3-0 suture under the existing epidural anesthesia in the usual fashion. Mother, baby, and father entered into the postpartum period in excellent  condition. This mother plans to breastfeed and Dr. Alejandra Cota will be  notified of this birth.         Gordo Michaels CNM    D: 12/17/2020 22:04:06       T: 12/17/2020 22:13:09     [id number]  Job#: [id number]     Doc#: [id number]    CC:

## 2020-12-19 VITALS
TEMPERATURE: 97.7 F | HEART RATE: 83 BPM | SYSTOLIC BLOOD PRESSURE: 136 MMHG | HEIGHT: 64 IN | RESPIRATION RATE: 18 BRPM | WEIGHT: 244 LBS | DIASTOLIC BLOOD PRESSURE: 84 MMHG | BODY MASS INDEX: 41.66 KG/M2

## 2020-12-19 PROBLEM — O42.90 AMNIOTIC FLUID LEAKING: Status: RESOLVED | Noted: 2020-12-17 | Resolved: 2020-12-19

## 2020-12-19 PROCEDURE — 6370000000 HC RX 637 (ALT 250 FOR IP): Performed by: ADVANCED PRACTICE MIDWIFE

## 2020-12-19 PROCEDURE — 90471 IMMUNIZATION ADMIN: CPT | Performed by: ADVANCED PRACTICE MIDWIFE

## 2020-12-19 PROCEDURE — 90715 TDAP VACCINE 7 YRS/> IM: CPT | Performed by: ADVANCED PRACTICE MIDWIFE

## 2020-12-19 PROCEDURE — 6360000002 HC RX W HCPCS: Performed by: ADVANCED PRACTICE MIDWIFE

## 2020-12-19 RX ORDER — IBUPROFEN 800 MG/1
800 TABLET ORAL EVERY 6 HOURS PRN
Qty: 60 TABLET | Refills: 0 | Status: SHIPPED | OUTPATIENT
Start: 2020-12-19

## 2020-12-19 RX ORDER — LANOLIN 100 %
OINTMENT (GRAM) TOPICAL
Qty: 1 TUBE | Refills: 3 | COMMUNITY
Start: 2020-12-19

## 2020-12-19 RX ORDER — PSEUDOEPHEDRINE HCL 30 MG
100 TABLET ORAL 2 TIMES DAILY
COMMUNITY
Start: 2020-12-19

## 2020-12-19 RX ADMIN — IBUPROFEN 800 MG: 800 TABLET, FILM COATED ORAL at 04:22

## 2020-12-19 RX ADMIN — TETANUS TOXOID, REDUCED DIPHTHERIA TOXOID AND ACELLULAR PERTUSSIS VACCINE, ADSORBED 0.5 ML: 5; 2.5; 8; 8; 2.5 SUSPENSION INTRAMUSCULAR at 10:41

## 2020-12-19 ASSESSMENT — PAIN SCALES - GENERAL: PAINLEVEL_OUTOF10: 3

## 2020-12-19 NOTE — FLOWSHEET NOTE
Discharge Phone Call Log  Patient Name: Suzan Feliciano     [facility] Care Provider: Michelle Monahan MD Discharge Date: 2020    Disposition of baby:    Phone Number: [phone] (home)     Attempts to Contact:  Date:    Nurse  Date:    Nurse  Date:    Nurse    1. Now that you are at home is your pain being well controlled? Y/N   What pain reducing measures are you using? ____________________________________        Information for the patient's :  Nuria Layne Girl Wendy [[id number]]   Delivery Method: Vaginal, Spontaneous     2. Are you currently  having any infant feeding issues? Y/N _____________________________ If yes, please explain: __________________________________________________________________  3. If breastfeeding, were you satisfied with the breastfeeding support services offered? Y/N  4.  Have you had to supplement? Y/N If yes, please explain: _____________________________________________________       Did you supplement while in the hospital, or begin formula supplementation at home?________________________________________  5. Did your OB provider offer you information about the benefits of breastfeeding during your prenatal visits? Y/N  6.  Have you made or have you already had your first appointment with the baby's doctor? Y/N If no, do you know when to schedule it? Y/N   7.  Have you scheduled your follow-up appointment? Y/N  If no, do you know when to schedule it? Y/N  8. Did staff discuss safe sleep during your stay? Y/N  Did you see the wall cling posted in your room explaining how to keep you and your baby safe? Y/N  10. Did your nurses and physicians include you in the plan of care, communicating with you respectfully? Y/N If no, please explain __________________________  11.  Is there anyone in particular you would like to mention who provided care for you? ________________________________ 12. Did your discharge occur in a timely manner? Y/N If no, please explain __________________________  13. Do you have any other questions or concerns I can address today?  Y/N  __________________________________________________      Teaching During interview :_____________________________________________  ___________________________RN       Date:______________Time:________________

## 2020-12-19 NOTE — LACTATION NOTE
This note was copied from a baby's chart. Lactation Progress Note      Data:   F/U with primip 2PP with breastfeeding and lactation rounds. MOB states that infant cluster fed overnight. States that breastfeeding is going well overall, but her left nipple is very sore. States that she doesn't know if overnight infant latches shallow a few times, but feels very tender this morning. Infant output established 8 urine/ 4 stool, weight loss @ 3.34%. Plans to be discharged home today, and f/u with pediatrician for weight check on Monday with [facility]. Action: Introduced self to patient as [address] for the day. Name and number placed on whiteboard. BF education reviewed with patient and what to expect over then next 1-2 weeks with mature milk production, infant feedings, infant output, breast care, engorgement prevention, pacifier, bottle use, and pumping information. Discussed how to position infant close to breast to help to achieve a deep latch while providing breast support. Also discussed using diferent positions to help with nipple soreness. Gel pads, and lanolin were given as well as handout on breast and nipple care. Instruction was given to call [address] prior to next feeding to observe infant latch at breast, and to assist as needed. Discharge binder also reviewed with patient with f/u care and resources provided. All questions answered at this time. RN updated with education that was provided to patient. Patient instructed to call for f/u care as needed. Response: MOB verbalizes understanding. Will call LC with next feeding attempt to observe infant latch and give tips as needed.

## 2020-12-19 NOTE — FLOWSHEET NOTE
Patient and FOB assessed for discharge readiness, both state that they feel well prepared to take this baby home. Instructed to have a rear facing car seat in the room prior to discharge. The birth certificate has been collected. The patient plans to schedule follow up appointment with [facility]   for Monday/Tuesday. The patient states that her discomfort has been well controlled during her stay. Vaccine record has been reviewed with the patient. The patient requests a tDap vaccine prior to her discharge.

## 2020-12-19 NOTE — PROGRESS NOTES
Department of Obstetrics and Gynecology  Labor and Delivery  Attending Post Partum Progress Note      SUBJECTIVE:  Feels well, denies complaints or concerns. Tolerating regular diet, ambulating well, voiding qs. Perineal discomfort/cramping well controlled w/ motrin. Baby is nursing well. Ready for discharge today.      OBJECTIVE:      Vitals:  /84   Pulse 83   Temp 97.7 °F (36.5 °C) (Axillary)   Resp 18   Ht 5' 4\" (1.626 m)   Wt 244 lb (110.7 kg)   Breastfeeding Unknown   BMI 41.88 kg/m²     ABDOMEN:  soft and non-tender, FF  GENITAL/URINARY:  SLR  LE: No evidence of DVT    DATA:    CBC:    Lab Results   Component Value Date    WBC 12.6 12/18/2020    RBC 3.46 12/18/2020    HGB 10.5 12/18/2020    HCT 31.4 12/18/2020    MCV 90.7 12/18/2020    RDW 13.4 12/18/2020     12/18/2020       ASSESSMENT & PLAN:    Multip s/p vaginal delivery  Stable nursing female infant  Discharge to home today  Reviewed discharge instructions, warning s/s to call for, s/s of post partum depression and  bleeding precautions  Rx motrin, continue PNV  F/u 6 wks

## 2020-12-19 NOTE — PROGRESS NOTES
Postpartum and infant care teaching completed and forms signed by patient. Copy witnessed by RN and given to patient. Patient verbalized understanding of all teaching points. Prescriptions given if applicable. Patient plans to follow-up with [place] as instructed. Patient verbalizes understanding of discharge instructions and denies further questions. ID bands checked. Mother's ID band and one of baby's ID bands removed and taped to discharge instruction sheet, signed by patient and witnessed by RN. Patient discharged in stable condition accompanied by family/guardian. Discharged in wheelchair, infant in car seat.

## 2020-12-19 NOTE — DISCHARGE SUMMARY
Obstetrical Discharge Form    Gestational Age: 39w3d    Antepartum complications: none    Date of Delivery: 20      Type of Delivery: vaginal, spontaneous    Delivered By: Martir Oconnor     Baby:      Information for the patient's :  Belgica Nguyen [[id number]]   APGAR One: 7     Information for the patient's :  Belgica Nguyen [[id number]]   APGAR Five: 9     Information for the patient's :  Belgica Nguyen [[id number]]   Birth Weight: 8 lb 5 oz (3.77 kg)       Anesthesia: Epidural    Intrapartum complications: None    Postpartum complications: none    Discharge Medication:    [facility]   Home Medication Instructions [id number]    Printed on:20 1004   Medication Information                      benzocaine-menthol (DERMOPLAST) 20-0.5 % AERO spray  Apply topically as needed for Pain             docusate sodium (COLACE, DULCOLAX) 100 MG CAPS  Take 100 mg by mouth 2 times daily             ibuprofen (ADVIL;MOTRIN) 800 MG tablet  Take 1 tablet by mouth every 6 hours as needed for Pain             lanolin ointment  Apply topically as needed. loratadine (CLARITIN) 10 MG tablet  Take 10 mg by mouth daily             pantoprazole (PROTONIX) 40 MG tablet  Take 1 tab po daily. Prenatal MV-Min-Fe Fum-FA-DHA (PRENATAL 1 PO)  Take by mouth daily              witch hazel-glycerin (TUCKS) pad  Place rectally as needed. Discharge Condition:  good    Discharge Date: 2020    PLAN:  Follow up in 6 weeks for routine PP visit  All questions answered  D/C summary begun at delivery for D/C planning purposes, any delay in discharge from ordered D/C date due to  factors.

## 2020-12-19 NOTE — PLAN OF CARE
Problem: VAGINAL DELIVERY - RECOVERY AND POST PARTUM  Goal: Vital signs are medically acceptable  Outcome: Completed  Goal: Patient will remain free of falls  Outcome: Completed  Goal: Fundus firm at midline  Outcome: Completed  Goal: Moderate rubra without clots, no purulent discharge, no foul smelling lochia  Outcome: Completed  Goal: Empties bladder  Outcome: Completed  Goal: Verbalizes understanding of normal bowel function resumption  Outcome: Completed  Goal: Edema will be absent or minimal  Outcome: Completed  Goal: Breasts are soft with nipple integrity intact  Outcome: Completed  Goal: Demonstrates appropriate breast feeding techniques  Outcome: Completed  Goal: Appropriate behavior observed  Outcome: Completed  Goal: Positive Mother-Baby interactions are observed  Outcome: Completed  Goal: Perineum intact without discharge or hematoma  Outcome: Completed  Goal: Ambulates independently  Outcome: Completed     Problem: PAIN  Goal: Patient's pain/discomfort is manageable  Outcome: Completed     Problem: KNOWLEDGE DEFICIT  Goal: Patient/S.O. demonstrates understanding of disease process, treatment plan, medications, and discharge instructions.   Outcome: Completed
12/18/2020 1949 by Gary Jean-Baptiste RN  Outcome: Ongoing  12/18/2020 0758 by Irene Roldan RN  Outcome: Ongoing  Goal: Ambulates independently  12/18/2020 1949 by Gary Jean-Baptiste RN  Outcome: Ongoing  12/18/2020 0758 by Irene Roldan RN  Outcome: Ongoing     Problem: PAIN  Goal: Patient's pain/discomfort is manageable  12/18/2020 1949 by Gary Jean-Baptiste RN  Outcome: Ongoing  12/18/2020 0758 by Irene Roldan RN  Outcome: Ongoing     Problem: KNOWLEDGE DEFICIT  Goal: Patient/S.O. demonstrates understanding of disease process, treatment plan, medications, and discharge instructions.   12/18/2020 1949 by Gary Jean-Baptiste RN  Outcome: Ongoing  12/18/2020 0758 by Irene Roldan RN  Outcome: Ongoing
Description: Experiences of bladder distention will decrease  Outcome: Ongoing  Goal: Urinary elimination within specified parameters  Description: Urinary elimination within specified parameters  Outcome: Ongoing

## 2020-12-19 NOTE — PROGRESS NOTES
Educated patient on importance of going to the restroom every 2-3 hours and changing pads. Educated patient on importance of calling RN about increased bleeding, light headedness, or any clots. Patient states understanding.

## (undated) DEVICE — 3M™ STERI-STRIP™ REINFORCED ADHESIVE SKIN CLOSURES, R1540, 1/8 IN X 3 IN (3 MM X 75 MM), 5 STRIPS/ENVELOPE: Brand: 3M™ STERI-STRIP™

## (undated) DEVICE — TISSUE RETRIEVAL SYSTEM: Brand: INZII RETRIEVAL SYSTEM

## (undated) DEVICE — 3M™ STERI-STRIP™ COMPOUND BENZOIN TINCTURE 40 BAGS/CARTON 4 CARTONS/CASE C1544: Brand: 3M™ STERI-STRIP™

## (undated) DEVICE — CORD ES L10FT MPLR LAP

## (undated) DEVICE — KIT,ANTI FOG,W/SPONGE & FLUID,SOFT PACK: Brand: MEDLINE

## (undated) DEVICE — APPLIER CLP M L L11.4IN DIA10MM ENDOSCP ROT MULT FOR LIG

## (undated) DEVICE — SUTURE ETHBND EXCEL SZ 0 L18IN NONABSORBABLE GRN L22MM MO-7 CX41D

## (undated) DEVICE — GLOVE ORANGE PI 7 1/2   MSG9075

## (undated) DEVICE — TUBING INSUF ISO CONN DISP

## (undated) DEVICE — ELECTRODE PT RET AD L9FT HI MOIST COND ADH HYDRGEL CORDED

## (undated) DEVICE — MAJOR SET UP PK

## (undated) DEVICE — DRAPE,ABDOMINAL,MAJOR,STERILE: Brand: MEDLINE

## (undated) DEVICE — SUTURE VCRL SZ 4-0 L18IN ABSRB UD L19MM PS-2 3/8 CIR PRIM J496H

## (undated) DEVICE — 3M™ TEGADERM™ TRANSPARENT FILM DRESSING FRAME STYLE, 1624W, 2-3/8 IN X 2-3/4 IN (6 CM X 7 CM), 100/CT 4CT/CASE: Brand: 3M™ TEGADERM™

## (undated) DEVICE — PUMP SUC IRR TBNG L10FT W/ HNDPC ASSEMB STRYKEFLOW 2

## (undated) DEVICE — TUBING, SUCTION, 3/16" X 10', STRAIGHT: Brand: MEDLINE

## (undated) DEVICE — STANDARD HYPODERMIC NEEDLE,POLYPROPYLENE HUB: Brand: MONOJECT

## (undated) DEVICE — ELECTRODE ES OD5 MM ST DISPOSABLE

## (undated) DEVICE — GAUZE SPONGES,8 PLY: Brand: CURITY

## (undated) DEVICE — SYRINGE MED 10ML LUERLOCK TIP W/O SFTY DISP

## (undated) DEVICE — TROCAR ENDOSCP L100MM DIA5MM BLDELSS STBL SL THRD OPT VW

## (undated) DEVICE — YANKAUER,BULB TIP,W/O VENT,RIGID,STERILE: Brand: MEDLINE

## (undated) DEVICE — TROCAR ENDOSCP L100MM DIA5MM BLDELSS STBL SL OBT RADLUC

## (undated) DEVICE — APPLICATOR PREP 26ML 0.7% IOD POVACRYLEX 74% ISO ALC ST

## (undated) DEVICE — DRAPE C ARM UNIV W41XL74IN CLR PLAS XR VELC CLSR POLY STRP

## (undated) DEVICE — SOLUTION IV IRRIG 500ML 0.9% SODIUM CHL 2F7123

## (undated) DEVICE — CIRCUIT ANES L72IN 3L BACT AND VIR FLTR EL CONN SGL LIMB

## (undated) DEVICE — GOWN SIRUS NONREIN XL W/TWL: Brand: MEDLINE INDUSTRIES, INC.